# Patient Record
Sex: MALE | Race: WHITE | NOT HISPANIC OR LATINO | Employment: OTHER | URBAN - METROPOLITAN AREA
[De-identification: names, ages, dates, MRNs, and addresses within clinical notes are randomized per-mention and may not be internally consistent; named-entity substitution may affect disease eponyms.]

---

## 2023-03-11 PROBLEM — N40.1 BENIGN PROSTATIC HYPERPLASIA (BPH) WITH POST-VOID DRIBBLING: Status: ACTIVE | Noted: 2023-03-11

## 2023-03-11 PROBLEM — N18.30 STAGE 3 CHRONIC KIDNEY DISEASE (MULTI): Status: ACTIVE | Noted: 2023-03-11

## 2023-03-11 PROBLEM — M10.9 GOUT: Status: ACTIVE | Noted: 2023-03-11

## 2023-03-11 PROBLEM — I35.0 AORTIC STENOSIS, MILD: Status: ACTIVE | Noted: 2023-03-11

## 2023-03-11 PROBLEM — R01.1 HEART MURMUR, SYSTOLIC: Status: ACTIVE | Noted: 2023-03-11

## 2023-03-11 PROBLEM — F03.90 DEMENTIA (MULTI): Status: ACTIVE | Noted: 2023-03-11

## 2023-03-11 PROBLEM — I50.30 HEART FAILURE WITH PRESERVED EJECTION FRACTION (MULTI): Status: ACTIVE | Noted: 2023-03-11

## 2023-03-11 PROBLEM — N39.43 BENIGN PROSTATIC HYPERPLASIA (BPH) WITH POST-VOID DRIBBLING: Status: ACTIVE | Noted: 2023-03-11

## 2023-03-11 PROBLEM — I10 BENIGN HYPERTENSION: Status: ACTIVE | Noted: 2023-03-11

## 2023-03-11 PROBLEM — I48.19 ATRIAL FIBRILLATION, PERSISTENT (MULTI): Status: ACTIVE | Noted: 2023-03-11

## 2023-03-11 PROBLEM — E11.9 DIABETES (MULTI): Status: ACTIVE | Noted: 2023-03-11

## 2023-03-11 PROBLEM — I63.9 CVA (CEREBRAL VASCULAR ACCIDENT) (MULTI): Status: ACTIVE | Noted: 2023-03-11

## 2023-03-11 PROBLEM — R25.1 TREMOR: Status: ACTIVE | Noted: 2023-03-11

## 2023-03-11 PROBLEM — E78.5 HYPERLIPIDEMIA: Status: ACTIVE | Noted: 2023-03-11

## 2023-03-11 PROBLEM — I87.2 CHRONIC VENOUS INSUFFICIENCY: Status: ACTIVE | Noted: 2023-03-11

## 2023-04-21 ENCOUNTER — NURSING HOME VISIT (OUTPATIENT)
Dept: POST ACUTE CARE | Facility: EXTERNAL LOCATION | Age: 88
End: 2023-04-21
Payer: MEDICARE

## 2023-04-21 VITALS
SYSTOLIC BLOOD PRESSURE: 98 MMHG | DIASTOLIC BLOOD PRESSURE: 60 MMHG | BODY MASS INDEX: 23.6 KG/M2 | RESPIRATION RATE: 20 BRPM | WEIGHT: 174 LBS | OXYGEN SATURATION: 93 % | TEMPERATURE: 98 F | HEART RATE: 82 BPM

## 2023-04-21 DIAGNOSIS — N18.32 STAGE 3B CHRONIC KIDNEY DISEASE (MULTI): ICD-10-CM

## 2023-04-21 DIAGNOSIS — I48.19 ATRIAL FIBRILLATION, PERSISTENT (MULTI): ICD-10-CM

## 2023-04-21 DIAGNOSIS — I50.32 CHRONIC HEART FAILURE WITH PRESERVED EJECTION FRACTION (MULTI): ICD-10-CM

## 2023-04-21 DIAGNOSIS — F03.B0 MODERATE DEMENTIA WITHOUT BEHAVIORAL DISTURBANCE, PSYCHOTIC DISTURBANCE, MOOD DISTURBANCE, OR ANXIETY, UNSPECIFIED DEMENTIA TYPE (MULTI): ICD-10-CM

## 2023-04-21 DIAGNOSIS — E11.69 TYPE 2 DIABETES MELLITUS WITH OTHER SPECIFIED COMPLICATION, WITHOUT LONG-TERM CURRENT USE OF INSULIN (MULTI): ICD-10-CM

## 2023-04-21 DIAGNOSIS — I63.9 CEREBROVASCULAR ACCIDENT (CVA), UNSPECIFIED MECHANISM (MULTI): Primary | ICD-10-CM

## 2023-04-21 PROCEDURE — 99309 SBSQ NF CARE MODERATE MDM 30: CPT | Performed by: INTERNAL MEDICINE

## 2023-04-21 NOTE — PROGRESS NOTES
Subjective   Patient ID: Zaire Jara is a 89 y.o. male who is long term resident being seen and evaluated for multiple medical problems.    HPI   This 89-year-old male patient is resting comfortably in his wheelchair sitting up in his room in no distress.  The patient has been receiving knee injections from orthopedic surgery for his painful knees.  Per nursing this seems to have given him some relief.  The patient himself has no complaints for me at this time and nursing has no adverse events reported to me.    Current high risk medication:  Aldactone  Allopurinol  Eliquis  Iron  Flomax  Januvia    Laboratory examination from December 2022:  Potassium 4.4  Bicarbonate 19  Creatinine 1.7  Albumin 3.7  Liver injury test normal  A1c 6.8  Hemoglobin 11.9  Ferritin 106    Review of Systems   Constitutional:  Negative for chills, diaphoresis and fever.   Respiratory:  Negative for cough and shortness of breath.    Cardiovascular:  Negative for chest pain and leg swelling.   Gastrointestinal:  Negative for constipation, diarrhea, nausea and vomiting.   Musculoskeletal:  Negative for joint swelling and myalgias.       Objective   BP 98/60   Pulse 82   Temp 36.7 °C (98 °F)   Resp 20   Wt 78.9 kg (174 lb)   SpO2 93%   BMI 23.60 kg/m²     Physical Exam  Vitals reviewed.   Constitutional:       General: He is not in acute distress.     Appearance: He is not ill-appearing.      Comments: Frail elderly male resting comfortably in wheelchair no distress   Cardiovascular:      Rate and Rhythm: Normal rate and regular rhythm.      Pulses: Normal pulses.      Heart sounds:      No gallop.   Pulmonary:      Breath sounds: Normal breath sounds. No wheezing, rhonchi or rales.   Abdominal:      General: Abdomen is flat. Bowel sounds are normal.      Palpations: Abdomen is soft.      Tenderness: There is no guarding or rebound.   Musculoskeletal:      Right lower leg: No edema.      Left lower leg: No edema.      Comments:  There is noted absence of lower extremity edema at this time         Assessment/Plan   Problem List Items Addressed This Visit          Nervous    CVA (cerebral vascular accident) (CMS/[facility]) - Primary    Dementia (CMS/[facility])       Circulatory    Atrial fibrillation, persistent (CMS/[facility])    Heart failure with preserved ejection fraction (CMS/[facility])       Genitourinary    Stage 3 chronic kidney disease (CMS/[facility])       Endocrine/Metabolic    Diabetes (CMS/[facility])       A.  We will continue with restorative and supportive care as the patient tolerates    B.  Laboratory examinations will next be due in June 2023 including hemoglobin A1c, ferritin, uric acid, TSH, vitamin B12, and vitamin D    C.  Ongoing orthopedic follow-up for the patient's knee injections to assist with the pain from severe osteoarthritis of the knee.  Due to his debility and multiple comorbid conditions the patient is not a very good candidate for joint replacement therapy.    D.  The patient's prognosis is guarded.

## 2023-04-21 NOTE — LETTER
Patient: Zaire Jara  : 1932    Encounter Date: 2023    Subjective  Patient ID: Zaire Jara is a 89 y.o. male who is long term resident being seen and evaluated for multiple medical problems.    HPI   This 89-year-old male patient is resting comfortably in his wheelchair sitting up in his room in no distress.  The patient has been receiving knee injections from orthopedic surgery for his painful knees.  Per nursing this seems to have given him some relief.  The patient himself has no complaints for me at this time and nursing has no adverse events reported to me.    Current high risk medication:  Aldactone  Allopurinol  Eliquis  Iron  Flomax  Januvia    Laboratory examination from 2022:  Potassium 4.4  Bicarbonate 19  Creatinine 1.7  Albumin 3.7  Liver injury test normal  A1c 6.8  Hemoglobin 11.9  Ferritin 106    Review of Systems   Constitutional:  Negative for chills, diaphoresis and fever.   Respiratory:  Negative for cough and shortness of breath.    Cardiovascular:  Negative for chest pain and leg swelling.   Gastrointestinal:  Negative for constipation, diarrhea, nausea and vomiting.   Musculoskeletal:  Negative for joint swelling and myalgias.       Objective  BP 98/60   Pulse 82   Temp 36.7 °C (98 °F)   Resp 20   Wt 78.9 kg (174 lb)   SpO2 93%   BMI 23.60 kg/m²     Physical Exam  Vitals reviewed.   Constitutional:       General: He is not in acute distress.     Appearance: He is not ill-appearing.      Comments: Frail elderly male resting comfortably in wheelchair no distress   Cardiovascular:      Rate and Rhythm: Normal rate and regular rhythm.      Pulses: Normal pulses.      Heart sounds:      No gallop.   Pulmonary:      Breath sounds: Normal breath sounds. No wheezing, rhonchi or rales.   Abdominal:      General: Abdomen is flat. Bowel sounds are normal.      Palpations: Abdomen is soft.      Tenderness: There is no guarding or rebound.   Musculoskeletal:      Right  lower leg: No edema.      Left lower leg: No edema.      Comments: There is noted absence of lower extremity edema at this time         Assessment/Plan  Problem List Items Addressed This Visit          Nervous    CVA (cerebral vascular accident) (CMS/[facility]) - Primary    Dementia (CMS/[facility])       Circulatory    Atrial fibrillation, persistent (CMS/[facility])    Heart failure with preserved ejection fraction (CMS/[facility])       Genitourinary    Stage 3 chronic kidney disease (CMS/[facility])       Endocrine/Metabolic    Diabetes (CMS/[facility])       A.  We will continue with restorative and supportive care as the patient tolerates    B.  Laboratory examinations will next be due in June 2023 including hemoglobin A1c, ferritin, uric acid, TSH, vitamin B12, and vitamin D    C.  Ongoing orthopedic follow-up for the patient's knee injections to assist with the pain from severe osteoarthritis of the knee.  Due to his debility and multiple comorbid conditions the patient is not a very good candidate for joint replacement therapy.    D.  The patient's prognosis is guarded.      Electronically Signed By: True Garrido MD   4/24/23  2:28 PM

## 2023-04-24 ASSESSMENT — ENCOUNTER SYMPTOMS
SHORTNESS OF BREATH: 0
NAUSEA: 0
COUGH: 0
DIAPHORESIS: 0
DIARRHEA: 0
MYALGIAS: 0
JOINT SWELLING: 0
CONSTIPATION: 0
VOMITING: 0
CHILLS: 0
FEVER: 0

## 2023-04-25 ENCOUNTER — NURSING HOME VISIT (OUTPATIENT)
Dept: POST ACUTE CARE | Facility: EXTERNAL LOCATION | Age: 88
End: 2023-04-25
Payer: MEDICARE

## 2023-04-25 VITALS
RESPIRATION RATE: 20 BRPM | BODY MASS INDEX: 23.6 KG/M2 | DIASTOLIC BLOOD PRESSURE: 67 MMHG | SYSTOLIC BLOOD PRESSURE: 110 MMHG | TEMPERATURE: 98.5 F | WEIGHT: 174 LBS | OXYGEN SATURATION: 93 % | HEART RATE: 94 BPM

## 2023-04-25 DIAGNOSIS — E11.69 TYPE 2 DIABETES MELLITUS WITH OTHER SPECIFIED COMPLICATION, WITHOUT LONG-TERM CURRENT USE OF INSULIN (MULTI): ICD-10-CM

## 2023-04-25 DIAGNOSIS — I48.19 ATRIAL FIBRILLATION, PERSISTENT (MULTI): ICD-10-CM

## 2023-04-25 DIAGNOSIS — F03.B0 MODERATE DEMENTIA WITHOUT BEHAVIORAL DISTURBANCE, PSYCHOTIC DISTURBANCE, MOOD DISTURBANCE, OR ANXIETY, UNSPECIFIED DEMENTIA TYPE (MULTI): ICD-10-CM

## 2023-04-25 DIAGNOSIS — N18.32 STAGE 3B CHRONIC KIDNEY DISEASE (MULTI): ICD-10-CM

## 2023-04-25 DIAGNOSIS — I63.9 CEREBROVASCULAR ACCIDENT (CVA), UNSPECIFIED MECHANISM (MULTI): Primary | ICD-10-CM

## 2023-04-25 PROCEDURE — 99309 SBSQ NF CARE MODERATE MDM 30: CPT | Performed by: NURSE PRACTITIONER

## 2023-04-25 ASSESSMENT — ENCOUNTER SYMPTOMS
CHILLS: 0
COUGH: 0
PALPITATIONS: 0
FEVER: 0
FATIGUE: 0
DIFFICULTY URINATING: 0
VOMITING: 0
ABDOMINAL PAIN: 0
CONSTIPATION: 0
NAUSEA: 0
SHORTNESS OF BREATH: 0
DIARRHEA: 0

## 2023-04-25 NOTE — LETTER
Patient: Zaire Jara  : 1932    Encounter Date: 2023    Subjective  Zaire Jara is a 89 y.o. male Here for routine monthly visit.    HPI There are no new problems and multiple health problems have been reviewed.  The course has been unchanged.  The patient  is mildly confused.    He recently had knee injected per orthopedics, per staff he is moving better.  There are no family members present during time of visit.    he  is sitting up in chair denies any complaints when asked.  Eating and drinking well.   No concerns per staff.       Review of Systems   Constitutional:  Negative for chills, fatigue and fever.   Respiratory:  Negative for cough and shortness of breath.    Cardiovascular:  Negative for chest pain and palpitations.   Gastrointestinal:  Negative for abdominal pain, constipation, diarrhea, nausea and vomiting.   Genitourinary:  Negative for difficulty urinating.       Objective  /67   Pulse 94   Temp 36.9 °C (98.5 °F)   Resp 20   Wt 78.9 kg (174 lb)   SpO2 93%   BMI 23.60 kg/m²     Physical Exam  Constitutional:       General: He is not in acute distress.  HENT:      Head: Normocephalic and atraumatic.   Eyes:      Conjunctiva/sclera: Conjunctivae normal.   Cardiovascular:      Rate and Rhythm: Normal rate and regular rhythm.   Pulmonary:      Effort: Pulmonary effort is normal. No respiratory distress.      Breath sounds: Normal breath sounds.   Abdominal:      General: Bowel sounds are normal. There is no distension.      Palpations: Abdomen is soft.      Tenderness: There is no abdominal tenderness.   Musculoskeletal:      Right lower leg: No edema.      Left lower leg: No edema.      Comments: diffusely decreased muscle mass  Mild left sided weakness.   Skin:     General: Skin is warm and dry.   Neurological:      General: No focal deficit present.      Mental Status: He is alert.   Psychiatric:         Mood and Affect: Mood normal.         Behavior: Behavior normal.          Assessment/Plan  Problem List Items Addressed This Visit          Nervous    CVA (cerebral vascular accident) (CMS/[facility]) - Primary     On statin.  continue with current medical management           Dementia (CMS/[facility])     Mild, forgetful.              Circulatory    Atrial fibrillation, persistent (CMS/[facility])     On eliquis,  rate controlled.  No sx of bleeding.  continue with current medical management                Genitourinary    Stage 3 chronic kidney disease (CMS/[facility])     monitor with routine labs.              Endocrine/Metabolic    Diabetes (CMS/[facility])     Blood sugars reviewed, acceptable.  Continue with current regimen and adjust meds based on clinical course.   On januvia          labs/meds/orders reviewed  staff to monitor and notify for any changes.  continue with supportive and restorative care  next labs 6/23 and prn      Electronically Signed By: NUSRAT Keller-CNP   4/25/23  4:27 PM

## 2023-04-25 NOTE — ASSESSMENT & PLAN NOTE
Blood sugars reviewed, acceptable.  Continue with current regimen and adjust meds based on clinical course.   On januvia

## 2023-04-25 NOTE — PROGRESS NOTES
Subjective   Zaire Jara is a 89 y.o. male Here for routine monthly visit.    HPI There are no new problems and multiple health problems have been reviewed.  The course has been unchanged.  The patient  is mildly confused.    He recently had knee injected per orthopedics, per staff he is moving better.  There are no family members present during time of visit.    he  is sitting up in chair denies any complaints when asked.  Eating and drinking well.   No concerns per staff.       Review of Systems   Constitutional:  Negative for chills, fatigue and fever.   Respiratory:  Negative for cough and shortness of breath.    Cardiovascular:  Negative for chest pain and palpitations.   Gastrointestinal:  Negative for abdominal pain, constipation, diarrhea, nausea and vomiting.   Genitourinary:  Negative for difficulty urinating.       Objective   /67   Pulse 94   Temp 36.9 °C (98.5 °F)   Resp 20   Wt 78.9 kg (174 lb)   SpO2 93%   BMI 23.60 kg/m²     Physical Exam  Constitutional:       General: He is not in acute distress.  HENT:      Head: Normocephalic and atraumatic.   Eyes:      Conjunctiva/sclera: Conjunctivae normal.   Cardiovascular:      Rate and Rhythm: Normal rate and regular rhythm.   Pulmonary:      Effort: Pulmonary effort is normal. No respiratory distress.      Breath sounds: Normal breath sounds.   Abdominal:      General: Bowel sounds are normal. There is no distension.      Palpations: Abdomen is soft.      Tenderness: There is no abdominal tenderness.   Musculoskeletal:      Right lower leg: No edema.      Left lower leg: No edema.      Comments: diffusely decreased muscle mass  Mild left sided weakness.   Skin:     General: Skin is warm and dry.   Neurological:      General: No focal deficit present.      Mental Status: He is alert.   Psychiatric:         Mood and Affect: Mood normal.         Behavior: Behavior normal.         Assessment/Plan   Problem List Items Addressed This Visit           Nervous    CVA (cerebral vascular accident) (CMS/[facility]) - Primary     On statin.  continue with current medical management           Dementia (CMS/[facility])     Mild, forgetful.              Circulatory    Atrial fibrillation, persistent (CMS/[facility])     On eliquis,  rate controlled.  No sx of bleeding.  continue with current medical management                Genitourinary    Stage 3 chronic kidney disease (CMS/[facility])     monitor with routine labs.              Endocrine/Metabolic    Diabetes (CMS/[facility])     Blood sugars reviewed, acceptable.  Continue with current regimen and adjust meds based on clinical course.   On januvia          labs/meds/orders reviewed  staff to monitor and notify for any changes.  continue with supportive and restorative care  next labs 6/23 and prn

## 2023-05-26 ENCOUNTER — NURSING HOME VISIT (OUTPATIENT)
Dept: POST ACUTE CARE | Facility: EXTERNAL LOCATION | Age: 88
End: 2023-05-26
Payer: MEDICARE

## 2023-05-26 DIAGNOSIS — I48.19 ATRIAL FIBRILLATION, PERSISTENT (MULTI): ICD-10-CM

## 2023-05-26 DIAGNOSIS — I10 BENIGN HYPERTENSION: ICD-10-CM

## 2023-05-26 DIAGNOSIS — F03.B0 MODERATE DEMENTIA WITHOUT BEHAVIORAL DISTURBANCE, PSYCHOTIC DISTURBANCE, MOOD DISTURBANCE, OR ANXIETY, UNSPECIFIED DEMENTIA TYPE (MULTI): ICD-10-CM

## 2023-05-26 DIAGNOSIS — I63.9 CEREBROVASCULAR ACCIDENT (CVA), UNSPECIFIED MECHANISM (MULTI): ICD-10-CM

## 2023-05-26 DIAGNOSIS — E11.69 TYPE 2 DIABETES MELLITUS WITH OTHER SPECIFIED COMPLICATION, WITHOUT LONG-TERM CURRENT USE OF INSULIN (MULTI): ICD-10-CM

## 2023-05-26 DIAGNOSIS — I50.32 CHRONIC HEART FAILURE WITH PRESERVED EJECTION FRACTION (MULTI): Primary | ICD-10-CM

## 2023-05-26 PROCEDURE — 99308 SBSQ NF CARE LOW MDM 20: CPT | Performed by: INTERNAL MEDICINE

## 2023-05-26 NOTE — LETTER
Patient: Zaire Jara  : 1932    Encounter Date: 2023    Subjective  Patient ID: Zaire Jara is a 89 y.o. male who is long term resident being seen and evaluated for multiple medical problems.    HPI   This 89-year-old male patient is resting comfortably in his bed in no distress.  He has no complaints of pain or shortness of breath me at this time.  Nursing has no new adverse events reported to me.    Current high risk medication:  Aldactone  Allopurinol  Eliquis  Iron  Tamsulosin  Januvia    Laboratory examination from 2022:  Potassium 4.4  Bicarbonate 19  Creatinine 1.7  Albumin 3.5  Liver injury test normal  Hemoglobin A1c 6.8  Hemoglobin 11.9  Ferritin 106    Review of Systems   Constitutional:  Negative for chills, diaphoresis and fever.   Respiratory:  Negative for cough and shortness of breath.    Cardiovascular:  Negative for chest pain and leg swelling.   Gastrointestinal:  Negative for constipation, diarrhea, nausea and vomiting.   Musculoskeletal:  Negative for joint swelling and myalgias.       Objective  /62   Pulse 82   Temp 36.8 °C (98.3 °F)   Resp 18   Wt 79.4 kg (175 lb)   SpO2 97%   BMI 23.73 kg/m²     Physical Exam  Constitutional:       General: He is not in acute distress.  HENT:      Head: Normocephalic and atraumatic.   Eyes:      Conjunctiva/sclera: Conjunctivae normal.   Cardiovascular:      Rate and Rhythm: Normal rate and regular rhythm.   Pulmonary:      Effort: Pulmonary effort is normal. No respiratory distress.      Breath sounds: Normal breath sounds.   Abdominal:      General: Bowel sounds are normal. There is no distension.      Palpations: Abdomen is soft.      Tenderness: There is no abdominal tenderness.   Musculoskeletal:      Right lower leg: No edema.      Left lower leg: No edema.      Comments: diffusely decreased muscle mass  Mild left sided weakness.   Skin:     General: Skin is warm and dry.   Neurological:      General: No focal  deficit present.      Mental Status: He is alert.   Psychiatric:         Mood and Affect: Mood normal.         Behavior: Behavior normal.         Assessment/Plan  Problem List Items Addressed This Visit          Nervous    CVA (cerebral vascular accident) (CMS/[facility])    Dementia (CMS/[facility])       Circulatory    Atrial fibrillation, persistent (CMS/[facility])    Benign hypertension    Heart failure with preserved ejection fraction (CMS/[facility]) - Primary       Endocrine/Metabolic    Diabetes (CMS/[facility])       A.  We will continue with restorative and supportive care as patient tolerates    B.  Laboratory examinations will next be due in June 2022 or as needed    C.  The patient's prognosis is guarded.      Electronically Signed By: True Garrido MD   6/5/23  4:39 PM

## 2023-05-30 VITALS
RESPIRATION RATE: 18 BRPM | HEART RATE: 82 BPM | SYSTOLIC BLOOD PRESSURE: 106 MMHG | BODY MASS INDEX: 23.73 KG/M2 | WEIGHT: 175 LBS | TEMPERATURE: 98.3 F | DIASTOLIC BLOOD PRESSURE: 62 MMHG | OXYGEN SATURATION: 97 %

## 2023-05-30 NOTE — PROGRESS NOTES
Subjective   Patient ID: Zaire Jara is a 89 y.o. male who is long term resident being seen and evaluated for multiple medical problems.    HPI   This 89-year-old male patient is resting comfortably in his bed in no distress.  He has no complaints of pain or shortness of breath me at this time.  Nursing has no new adverse events reported to me.    Current high risk medication:  Aldactone  Allopurinol  Eliquis  Iron  Tamsulosin  Januvia    Laboratory examination from December 2022:  Potassium 4.4  Bicarbonate 19  Creatinine 1.7  Albumin 3.5  Liver injury test normal  Hemoglobin A1c 6.8  Hemoglobin 11.9  Ferritin 106    Review of Systems   Constitutional:  Negative for chills, diaphoresis and fever.   Respiratory:  Negative for cough and shortness of breath.    Cardiovascular:  Negative for chest pain and leg swelling.   Gastrointestinal:  Negative for constipation, diarrhea, nausea and vomiting.   Musculoskeletal:  Negative for joint swelling and myalgias.       Objective   /62   Pulse 82   Temp 36.8 °C (98.3 °F)   Resp 18   Wt 79.4 kg (175 lb)   SpO2 97%   BMI 23.73 kg/m²     Physical Exam  Constitutional:       General: He is not in acute distress.  HENT:      Head: Normocephalic and atraumatic.   Eyes:      Conjunctiva/sclera: Conjunctivae normal.   Cardiovascular:      Rate and Rhythm: Normal rate and regular rhythm.   Pulmonary:      Effort: Pulmonary effort is normal. No respiratory distress.      Breath sounds: Normal breath sounds.   Abdominal:      General: Bowel sounds are normal. There is no distension.      Palpations: Abdomen is soft.      Tenderness: There is no abdominal tenderness.   Musculoskeletal:      Right lower leg: No edema.      Left lower leg: No edema.      Comments: diffusely decreased muscle mass  Mild left sided weakness.   Skin:     General: Skin is warm and dry.   Neurological:      General: No focal deficit present.      Mental Status: He is alert.   Psychiatric:          Mood and Affect: Mood normal.         Behavior: Behavior normal.         Assessment/Plan   Problem List Items Addressed This Visit          Nervous    CVA (cerebral vascular accident) (CMS/[facility])    Dementia (CMS/[facility])       Circulatory    Atrial fibrillation, persistent (CMS/[facility])    Benign hypertension    Heart failure with preserved ejection fraction (CMS/[facility]) - Primary       Endocrine/Metabolic    Diabetes (CMS/[facility])       A.  We will continue with restorative and supportive care as patient tolerates    B.  Laboratory examinations will next be due in June 2022 or as needed    C.  The patient's prognosis is guarded.

## 2023-06-05 ASSESSMENT — ENCOUNTER SYMPTOMS
NAUSEA: 0
FEVER: 0
JOINT SWELLING: 0
VOMITING: 0
SHORTNESS OF BREATH: 0
DIARRHEA: 0
COUGH: 0
CONSTIPATION: 0
CHILLS: 0
MYALGIAS: 0
DIAPHORESIS: 0

## 2023-06-16 ENCOUNTER — NURSING HOME VISIT (OUTPATIENT)
Dept: POST ACUTE CARE | Facility: EXTERNAL LOCATION | Age: 88
End: 2023-06-16
Payer: MEDICARE

## 2023-06-16 DIAGNOSIS — I48.19 ATRIAL FIBRILLATION, PERSISTENT (MULTI): ICD-10-CM

## 2023-06-16 DIAGNOSIS — I50.32 CHRONIC HEART FAILURE WITH PRESERVED EJECTION FRACTION (MULTI): Primary | ICD-10-CM

## 2023-06-16 DIAGNOSIS — I10 BENIGN HYPERTENSION: ICD-10-CM

## 2023-06-16 DIAGNOSIS — F03.B0 MODERATE DEMENTIA WITHOUT BEHAVIORAL DISTURBANCE, PSYCHOTIC DISTURBANCE, MOOD DISTURBANCE, OR ANXIETY, UNSPECIFIED DEMENTIA TYPE (MULTI): ICD-10-CM

## 2023-06-16 DIAGNOSIS — E11.69 TYPE 2 DIABETES MELLITUS WITH OTHER SPECIFIED COMPLICATION, WITHOUT LONG-TERM CURRENT USE OF INSULIN (MULTI): ICD-10-CM

## 2023-06-16 DIAGNOSIS — I63.9 CEREBROVASCULAR ACCIDENT (CVA), UNSPECIFIED MECHANISM (MULTI): ICD-10-CM

## 2023-06-16 DIAGNOSIS — N18.32 STAGE 3B CHRONIC KIDNEY DISEASE (MULTI): ICD-10-CM

## 2023-06-16 PROCEDURE — 99308 SBSQ NF CARE LOW MDM 20: CPT | Performed by: INTERNAL MEDICINE

## 2023-06-16 NOTE — LETTER
Patient: Zaire Jara  : 1932    Encounter Date: 2023    Subjective  Patient ID: Zaire Jara is a 89 y.o. male who is long term resident being seen and evaluated for multiple medical problems.    HPI   This 89-year-old male patient is sitting up in his wheelchair in his room in no distress.  He reports that his strength is improved and ability to stand is improved after knee injection therapy.  He has no complaints of pain or shortness of breath me at this time.    Current high risk medication:  Aldactone  Allopurinol  Eliquis  Iron  Tamsulosin  Januvia    Laboratory examination from 2022:  Potassium 4.4  Bicarbonate 19  Creatinine 1.7  Albumin 3.5  Liver injury test normal  A1c 6.8  Ferritin 106  Hemoglobin 11.9     Review of Systems   Constitutional:  Negative for chills, diaphoresis and fever.   Respiratory:  Negative for cough and shortness of breath.    Cardiovascular:  Negative for chest pain and leg swelling.   Gastrointestinal:  Negative for constipation, diarrhea, nausea and vomiting.   Musculoskeletal:  Negative for joint swelling and myalgias.       Objective  /63   Pulse 72   Temp 36.8 °C (98.2 °F)   Resp 16   Wt 79.4 kg (175 lb)   SpO2 98%   BMI 23.73 kg/m²     Physical Exam  Constitutional:       General: He is not in acute distress.  HENT:      Head: Normocephalic and atraumatic.   Eyes:      Conjunctiva/sclera: Conjunctivae normal.   Cardiovascular:      Rate and Rhythm: Normal rate and regular rhythm.   Pulmonary:      Effort: Pulmonary effort is normal. No respiratory distress.      Breath sounds: Normal breath sounds.   Abdominal:      General: Bowel sounds are normal. There is no distension.      Palpations: Abdomen is soft.      Tenderness: There is no abdominal tenderness.   Musculoskeletal:      Right lower leg: No edema.      Left lower leg: No edema.      Comments: diffusely decreased muscle mass  Mild left sided weakness.   Skin:     General: Skin is  warm and dry.   Neurological:      General: No focal deficit present.      Mental Status: He is alert.   Psychiatric:         Mood and Affect: Mood normal.         Behavior: Behavior normal.         Assessment/Plan  Problem List Items Addressed This Visit       Atrial fibrillation, persistent (CMS/[facility])    Benign hypertension    CVA (cerebral vascular accident) (CMS/[facility])    Dementia (CMS/[facility])    Diabetes (CMS/[facility])    Heart failure with preserved ejection fraction (CMS/[facility]) - Primary    Stage 3 chronic kidney disease (CMS/[facility])       A.  We will continue with restorative and supportive care as the patient tolerates    B.  Laboratory examinations of been ordered to monitor his medications and medical conditions    C.  The patient's prognosis is guarded.      Electronically Signed By: True Garrido MD   6/28/23  3:23 PM

## 2023-06-19 VITALS
DIASTOLIC BLOOD PRESSURE: 63 MMHG | RESPIRATION RATE: 16 BRPM | WEIGHT: 175 LBS | OXYGEN SATURATION: 98 % | TEMPERATURE: 98.2 F | BODY MASS INDEX: 23.73 KG/M2 | HEART RATE: 72 BPM | SYSTOLIC BLOOD PRESSURE: 106 MMHG

## 2023-06-19 NOTE — PROGRESS NOTES
Subjective   Patient ID: Zaire Jara is a 89 y.o. male who is long term resident being seen and evaluated for multiple medical problems.    HPI   This 89-year-old male patient is sitting up in his wheelchair in his room in no distress.  He reports that his strength is improved and ability to stand is improved after knee injection therapy.  He has no complaints of pain or shortness of breath me at this time.    Current high risk medication:  Aldactone  Allopurinol  Eliquis  Iron  Tamsulosin  Januvia    Laboratory examination from December 2022:  Potassium 4.4  Bicarbonate 19  Creatinine 1.7  Albumin 3.5  Liver injury test normal  A1c 6.8  Ferritin 106  Hemoglobin 11.9     Review of Systems   Constitutional:  Negative for chills, diaphoresis and fever.   Respiratory:  Negative for cough and shortness of breath.    Cardiovascular:  Negative for chest pain and leg swelling.   Gastrointestinal:  Negative for constipation, diarrhea, nausea and vomiting.   Musculoskeletal:  Negative for joint swelling and myalgias.       Objective   /63   Pulse 72   Temp 36.8 °C (98.2 °F)   Resp 16   Wt 79.4 kg (175 lb)   SpO2 98%   BMI 23.73 kg/m²     Physical Exam  Constitutional:       General: He is not in acute distress.  HENT:      Head: Normocephalic and atraumatic.   Eyes:      Conjunctiva/sclera: Conjunctivae normal.   Cardiovascular:      Rate and Rhythm: Normal rate and regular rhythm.   Pulmonary:      Effort: Pulmonary effort is normal. No respiratory distress.      Breath sounds: Normal breath sounds.   Abdominal:      General: Bowel sounds are normal. There is no distension.      Palpations: Abdomen is soft.      Tenderness: There is no abdominal tenderness.   Musculoskeletal:      Right lower leg: No edema.      Left lower leg: No edema.      Comments: diffusely decreased muscle mass  Mild left sided weakness.   Skin:     General: Skin is warm and dry.   Neurological:      General: No focal deficit present.       Mental Status: He is alert.   Psychiatric:         Mood and Affect: Mood normal.         Behavior: Behavior normal.         Assessment/Plan   Problem List Items Addressed This Visit       Atrial fibrillation, persistent (CMS/[facility])    Benign hypertension    CVA (cerebral vascular accident) (CMS/[facility])    Dementia (CMS/[facility])    Diabetes (CMS/[facility])    Heart failure with preserved ejection fraction (CMS/[facility]) - Primary    Stage 3 chronic kidney disease (CMS/[facility])       A.  We will continue with restorative and supportive care as the patient tolerates    B.  Laboratory examinations of been ordered to monitor his medications and medical conditions    C.  The patient's prognosis is guarded.

## 2023-06-20 ENCOUNTER — NURSING HOME VISIT (OUTPATIENT)
Dept: POST ACUTE CARE | Facility: EXTERNAL LOCATION | Age: 88
End: 2023-06-20
Payer: MEDICARE

## 2023-06-20 VITALS
RESPIRATION RATE: 16 BRPM | DIASTOLIC BLOOD PRESSURE: 55 MMHG | SYSTOLIC BLOOD PRESSURE: 115 MMHG | HEART RATE: 93 BPM | WEIGHT: 174 LBS | TEMPERATURE: 98.3 F | OXYGEN SATURATION: 98 % | BODY MASS INDEX: 23.6 KG/M2

## 2023-06-20 DIAGNOSIS — F03.B0 MODERATE DEMENTIA WITHOUT BEHAVIORAL DISTURBANCE, PSYCHOTIC DISTURBANCE, MOOD DISTURBANCE, OR ANXIETY, UNSPECIFIED DEMENTIA TYPE (MULTI): ICD-10-CM

## 2023-06-20 DIAGNOSIS — I48.19 ATRIAL FIBRILLATION, PERSISTENT (MULTI): ICD-10-CM

## 2023-06-20 DIAGNOSIS — N18.32 STAGE 3B CHRONIC KIDNEY DISEASE (MULTI): ICD-10-CM

## 2023-06-20 DIAGNOSIS — E11.69 TYPE 2 DIABETES MELLITUS WITH OTHER SPECIFIED COMPLICATION, WITHOUT LONG-TERM CURRENT USE OF INSULIN (MULTI): ICD-10-CM

## 2023-06-20 DIAGNOSIS — I50.32 CHRONIC HEART FAILURE WITH PRESERVED EJECTION FRACTION (MULTI): ICD-10-CM

## 2023-06-20 DIAGNOSIS — I63.9 CEREBROVASCULAR ACCIDENT (CVA), UNSPECIFIED MECHANISM (MULTI): Primary | ICD-10-CM

## 2023-06-20 PROCEDURE — 99309 SBSQ NF CARE MODERATE MDM 30: CPT | Performed by: NURSE PRACTITIONER

## 2023-06-20 ASSESSMENT — ENCOUNTER SYMPTOMS
FATIGUE: 0
CHILLS: 0
FEVER: 0
VOMITING: 0
ABDOMINAL PAIN: 0
COUGH: 0
PALPITATIONS: 0
DIFFICULTY URINATING: 0
DIARRHEA: 0
NAUSEA: 0
SHORTNESS OF BREATH: 0
CONSTIPATION: 0

## 2023-06-20 NOTE — ASSESSMENT & PLAN NOTE
Blood sugars reviewed, acceptable.  Continue with current regimen and adjust meds based on clinical course.   On januvia, prtoein/creatinine ratio normal. . A1C is pending

## 2023-06-20 NOTE — LETTER
Patient: Zaire Jara  : 1932    Encounter Date: 2023    Subjective  Zaire Jara is a 89 y.o. male Here for routine monthly visit.    HPI There are no new problems and multiple health problems have been reviewed.  The course has been unchanged.  The patient  is mildly confused.  There are no family members present during time of visit.    he  is sitting up in chair denies any complaints when asked.  Eating and drinking well.   No concerns per staff.       Review of Systems   Constitutional:  Negative for chills, fatigue and fever.   Respiratory:  Negative for cough and shortness of breath.    Cardiovascular:  Negative for chest pain and palpitations.   Gastrointestinal:  Negative for abdominal pain, constipation, diarrhea, nausea and vomiting.   Genitourinary:  Negative for difficulty urinating.       Objective  /55   Pulse 93   Temp 36.8 °C (98.3 °F)   Resp 16   Wt 78.9 kg (174 lb)   SpO2 98%   BMI 23.60 kg/m²     Physical Exam  Constitutional:       General: He is not in acute distress.  HENT:      Head: Normocephalic and atraumatic.   Eyes:      Conjunctiva/sclera: Conjunctivae normal.   Cardiovascular:      Rate and Rhythm: Normal rate and regular rhythm.   Pulmonary:      Effort: Pulmonary effort is normal. No respiratory distress.      Breath sounds: Normal breath sounds.   Abdominal:      General: Bowel sounds are normal. There is no distension.      Palpations: Abdomen is soft.      Tenderness: There is no abdominal tenderness.   Musculoskeletal:      Right lower leg: No edema.      Left lower leg: No edema.      Comments: diffusely decreased muscle mass  Mild left sided weakness.   Skin:     General: Skin is warm and dry.   Neurological:      General: No focal deficit present.      Mental Status: He is alert.   Psychiatric:         Mood and Affect: Mood normal.         Behavior: Behavior normal.         Assessment/Plan  Problem List Items Addressed This Visit          Nervous     CVA (cerebral vascular accident) (CMS/[facility]) - Primary     On statin.  continue with current medical management           Dementia (CMS/HCC)     Mild, forgetful.              Circulatory    Atrial fibrillation, persistent (CMS/HCC)     On eliquis,  rate controlled.  No sx of bleeding.  continue with current medical management             Heart failure with preserved ejection fraction (CMS/[facility])     No symptoms of central fluid volume overload.              Endocrine/Metabolic    Diabetes (CMS/[facility])     Blood sugars reviewed, acceptable.  Continue with current regimen and adjust meds based on clinical course.   On januvia, prtoein/creatinine ratio normal. . A1C is pending          labs/meds/orders reviewed  staff to monitor and notify for any changes.  continue with supportive and restorative care  next labs 12/23 and prn      Electronically Signed By: NUSRAT Keller-CHENCHO   6/20/23 12:52 PM

## 2023-06-20 NOTE — PROGRESS NOTES
Subjective   Zaire Jara is a 89 y.o. male Here for routine monthly visit.    HPI There are no new problems and multiple health problems have been reviewed.  The course has been unchanged.  The patient  is mildly confused.  There are no family members present during time of visit.    he  is sitting up in chair denies any complaints when asked.  Eating and drinking well.   No concerns per staff.       Review of Systems   Constitutional:  Negative for chills, fatigue and fever.   Respiratory:  Negative for cough and shortness of breath.    Cardiovascular:  Negative for chest pain and palpitations.   Gastrointestinal:  Negative for abdominal pain, constipation, diarrhea, nausea and vomiting.   Genitourinary:  Negative for difficulty urinating.       Objective   /55   Pulse 93   Temp 36.8 °C (98.3 °F)   Resp 16   Wt 78.9 kg (174 lb)   SpO2 98%   BMI 23.60 kg/m²     Physical Exam  Constitutional:       General: He is not in acute distress.  HENT:      Head: Normocephalic and atraumatic.   Eyes:      Conjunctiva/sclera: Conjunctivae normal.   Cardiovascular:      Rate and Rhythm: Normal rate and regular rhythm.   Pulmonary:      Effort: Pulmonary effort is normal. No respiratory distress.      Breath sounds: Normal breath sounds.   Abdominal:      General: Bowel sounds are normal. There is no distension.      Palpations: Abdomen is soft.      Tenderness: There is no abdominal tenderness.   Musculoskeletal:      Right lower leg: No edema.      Left lower leg: No edema.      Comments: diffusely decreased muscle mass  Mild left sided weakness.   Skin:     General: Skin is warm and dry.   Neurological:      General: No focal deficit present.      Mental Status: He is alert.   Psychiatric:         Mood and Affect: Mood normal.         Behavior: Behavior normal.         Assessment/Plan   Problem List Items Addressed This Visit          Nervous    CVA (cerebral vascular accident) (CMS/HCC) - Primary     On  statin.  continue with current medical management           Dementia (CMS/[facility])     Mild, forgetful.              Circulatory    Atrial fibrillation, persistent (CMS/[facility])     On eliquis,  rate controlled.  No sx of bleeding.  continue with current medical management             Heart failure with preserved ejection fraction (CMS/[facility])     No symptoms of central fluid volume overload.              Endocrine/Metabolic    Diabetes (CMS/[facility])     Blood sugars reviewed, acceptable.  Continue with current regimen and adjust meds based on clinical course.   On januvia, prtoein/creatinine ratio normal. . A1C is pending          labs/meds/orders reviewed  staff to monitor and notify for any changes.  continue with supportive and restorative care  next labs 12/23 and prn

## 2023-06-28 ASSESSMENT — ENCOUNTER SYMPTOMS
MYALGIAS: 0
JOINT SWELLING: 0
CHILLS: 0
CONSTIPATION: 0
NAUSEA: 0
SHORTNESS OF BREATH: 0
FEVER: 0
VOMITING: 0
DIARRHEA: 0
COUGH: 0
DIAPHORESIS: 0

## 2023-07-21 ENCOUNTER — NURSING HOME VISIT (OUTPATIENT)
Dept: POST ACUTE CARE | Facility: EXTERNAL LOCATION | Age: 88
End: 2023-07-21
Payer: MEDICARE

## 2023-07-21 DIAGNOSIS — I50.32 CHRONIC HEART FAILURE WITH PRESERVED EJECTION FRACTION (MULTI): Primary | ICD-10-CM

## 2023-07-21 DIAGNOSIS — E11.69 TYPE 2 DIABETES MELLITUS WITH OTHER SPECIFIED COMPLICATION, WITHOUT LONG-TERM CURRENT USE OF INSULIN (MULTI): ICD-10-CM

## 2023-07-21 DIAGNOSIS — I63.9 CEREBROVASCULAR ACCIDENT (CVA), UNSPECIFIED MECHANISM (MULTI): ICD-10-CM

## 2023-07-21 DIAGNOSIS — F03.B0 MODERATE DEMENTIA WITHOUT BEHAVIORAL DISTURBANCE, PSYCHOTIC DISTURBANCE, MOOD DISTURBANCE, OR ANXIETY, UNSPECIFIED DEMENTIA TYPE (MULTI): ICD-10-CM

## 2023-07-21 DIAGNOSIS — I10 BENIGN HYPERTENSION: ICD-10-CM

## 2023-07-21 DIAGNOSIS — I48.19 ATRIAL FIBRILLATION, PERSISTENT (MULTI): ICD-10-CM

## 2023-07-21 PROCEDURE — 99308 SBSQ NF CARE LOW MDM 20: CPT | Performed by: INTERNAL MEDICINE

## 2023-07-21 NOTE — LETTER
Patient: Zaire Jara  : 1932    Encounter Date: 2023    Subjective  Patient ID: Zaire Jara is a 89 y.o. male who is long term resident being seen and evaluated for multiple medical problems.    HPI   This 89-year-old male patient is sitting comfortably in his wheelchair in no distress.  The patient has recently been treated with doxycycline for toe infection.  The patient also is status post left forehead skin biopsy he himself has no complaints of pain or shortness of breath and nursing has no new other adverse events reported to me.    Current high risk medication:  Aldactone  Allopurinol  Eliquis  Iron  Flomax  Januvia    Laboratory examinations from 2023:  Potassium 4.5  Bicarbonate 23  Creatinine 1.5  Albumin 3.4  Liver injury test normal  Hemoglobin 11.0  B12 519  TSH 1.6  Vitamin D 33  A1c 6.5    Review of Systems   Constitutional:  Negative for chills, diaphoresis and fever.   Respiratory:  Negative for cough and shortness of breath.    Cardiovascular:  Negative for chest pain and leg swelling.   Gastrointestinal:  Negative for constipation, diarrhea, nausea and vomiting.   Musculoskeletal:  Negative for joint swelling and myalgias.       Objective  /72   Pulse 76   Temp 36.6 °C (97.9 °F)   Resp 18   Wt 81.2 kg (179 lb)   SpO2 93%   BMI 24.28 kg/m²     Physical Exam  Constitutional:       General: He is not in acute distress.  HENT:      Head: Normocephalic and atraumatic.   Eyes:      Conjunctiva/sclera: Conjunctivae normal.   Cardiovascular:      Rate and Rhythm: Normal rate and regular rhythm.   Pulmonary:      Effort: Pulmonary effort is normal. No respiratory distress.      Breath sounds: Normal breath sounds.   Abdominal:      General: Bowel sounds are normal. There is no distension.      Palpations: Abdomen is soft.      Tenderness: There is no abdominal tenderness.   Musculoskeletal:      Right lower leg: No edema.      Left lower leg: No edema.      Comments:  diffusely decreased muscle mass  Mild left sided weakness.   Skin:     General: Skin is warm and dry.   Neurological:      General: No focal deficit present.      Mental Status: He is alert.   Psychiatric:         Mood and Affect: Mood normal.         Behavior: Behavior normal.         Assessment/Plan  Problem List Items Addressed This Visit       Atrial fibrillation, persistent (CMS/HCC)    Benign hypertension    CVA (cerebral vascular accident) (CMS/HCC)    Dementia (CMS/HCC)    Diabetes (CMS/HCC)    Heart failure with preserved ejection fraction (CMS/HCC) - Primary       A.  We will continue with restorative and supportive care as the patient tolerates    B.  Laboratory examinations will next be due in December 2023 or as needed    C.  Ongoing monitoring of the patient's ingrown toenail for further infection    D.  The patient's prognosis is guarded.      Electronically Signed By: True Garrido MD   7/31/23  4:44 PM

## 2023-07-26 VITALS
WEIGHT: 179 LBS | OXYGEN SATURATION: 93 % | TEMPERATURE: 97.9 F | SYSTOLIC BLOOD PRESSURE: 108 MMHG | RESPIRATION RATE: 18 BRPM | BODY MASS INDEX: 24.28 KG/M2 | DIASTOLIC BLOOD PRESSURE: 72 MMHG | HEART RATE: 76 BPM

## 2023-07-26 NOTE — PROGRESS NOTES
Subjective   Patient ID: Zaire Jara is a 89 y.o. male who is long term resident being seen and evaluated for multiple medical problems.    HPI   This 89-year-old male patient is sitting comfortably in his wheelchair in no distress.  The patient has recently been treated with doxycycline for toe infection.  The patient also is status post left forehead skin biopsy he himself has no complaints of pain or shortness of breath and nursing has no new other adverse events reported to me.    Current high risk medication:  Aldactone  Allopurinol  Eliquis  Iron  Flomax  Januvia    Laboratory examinations from June 2023:  Potassium 4.5  Bicarbonate 23  Creatinine 1.5  Albumin 3.4  Liver injury test normal  Hemoglobin 11.0  B12 519  TSH 1.6  Vitamin D 33  A1c 6.5    Review of Systems   Constitutional:  Negative for chills, diaphoresis and fever.   Respiratory:  Negative for cough and shortness of breath.    Cardiovascular:  Negative for chest pain and leg swelling.   Gastrointestinal:  Negative for constipation, diarrhea, nausea and vomiting.   Musculoskeletal:  Negative for joint swelling and myalgias.       Objective   /72   Pulse 76   Temp 36.6 °C (97.9 °F)   Resp 18   Wt 81.2 kg (179 lb)   SpO2 93%   BMI 24.28 kg/m²     Physical Exam  Constitutional:       General: He is not in acute distress.  HENT:      Head: Normocephalic and atraumatic.   Eyes:      Conjunctiva/sclera: Conjunctivae normal.   Cardiovascular:      Rate and Rhythm: Normal rate and regular rhythm.   Pulmonary:      Effort: Pulmonary effort is normal. No respiratory distress.      Breath sounds: Normal breath sounds.   Abdominal:      General: Bowel sounds are normal. There is no distension.      Palpations: Abdomen is soft.      Tenderness: There is no abdominal tenderness.   Musculoskeletal:      Right lower leg: No edema.      Left lower leg: No edema.      Comments: diffusely decreased muscle mass  Mild left sided weakness.   Skin:      General: Skin is warm and dry.   Neurological:      General: No focal deficit present.      Mental Status: He is alert.   Psychiatric:         Mood and Affect: Mood normal.         Behavior: Behavior normal.         Assessment/Plan   Problem List Items Addressed This Visit       Atrial fibrillation, persistent (CMS/HCC)    Benign hypertension    CVA (cerebral vascular accident) (CMS/HCC)    Dementia (CMS/HCC)    Diabetes (CMS/HCC)    Heart failure with preserved ejection fraction (CMS/HCC) - Primary       A.  We will continue with restorative and supportive care as the patient tolerates    B.  Laboratory examinations will next be due in December 2023 or as needed    C.  Ongoing monitoring of the patient's ingrown toenail for further infection    D.  The patient's prognosis is guarded.

## 2023-07-31 ASSESSMENT — ENCOUNTER SYMPTOMS
DIAPHORESIS: 0
JOINT SWELLING: 0
FEVER: 0
VOMITING: 0
COUGH: 0
CONSTIPATION: 0
MYALGIAS: 0
CHILLS: 0
SHORTNESS OF BREATH: 0
NAUSEA: 0
DIARRHEA: 0

## 2023-08-01 ENCOUNTER — NURSING HOME VISIT (OUTPATIENT)
Dept: POST ACUTE CARE | Facility: EXTERNAL LOCATION | Age: 88
End: 2023-08-01
Payer: MEDICARE

## 2023-08-01 VITALS
WEIGHT: 179 LBS | SYSTOLIC BLOOD PRESSURE: 110 MMHG | BODY MASS INDEX: 24.28 KG/M2 | DIASTOLIC BLOOD PRESSURE: 74 MMHG | RESPIRATION RATE: 18 BRPM | OXYGEN SATURATION: 93 % | TEMPERATURE: 97.9 F | HEART RATE: 95 BPM

## 2023-08-01 DIAGNOSIS — I50.32 CHRONIC HEART FAILURE WITH PRESERVED EJECTION FRACTION (MULTI): ICD-10-CM

## 2023-08-01 DIAGNOSIS — E11.69 TYPE 2 DIABETES MELLITUS WITH OTHER SPECIFIED COMPLICATION, WITHOUT LONG-TERM CURRENT USE OF INSULIN (MULTI): ICD-10-CM

## 2023-08-01 DIAGNOSIS — I63.9 CEREBROVASCULAR ACCIDENT (CVA), UNSPECIFIED MECHANISM (MULTI): ICD-10-CM

## 2023-08-01 DIAGNOSIS — I48.19 ATRIAL FIBRILLATION, PERSISTENT (MULTI): Primary | ICD-10-CM

## 2023-08-01 DIAGNOSIS — N18.32 STAGE 3B CHRONIC KIDNEY DISEASE (MULTI): ICD-10-CM

## 2023-08-01 PROCEDURE — 99309 SBSQ NF CARE MODERATE MDM 30: CPT | Performed by: NURSE PRACTITIONER

## 2023-08-01 ASSESSMENT — ENCOUNTER SYMPTOMS
CHILLS: 0
COUGH: 0
FATIGUE: 0
DIARRHEA: 0
DIFFICULTY URINATING: 0
CONSTIPATION: 0
FEVER: 0
SHORTNESS OF BREATH: 0
NAUSEA: 0
ABDOMINAL PAIN: 0
VOMITING: 0
PALPITATIONS: 0

## 2023-08-01 NOTE — ASSESSMENT & PLAN NOTE
Blood sugars reviewed, acceptable.  Continue with current regimen and adjust meds based on clinical course.   On januvia, prtoein/creatinine ratio normal. . A1C 6.5 6/23, at goal

## 2023-08-01 NOTE — LETTER
Patient: Zaire Jara  : 1932    Encounter Date: 2023    Subjective  Zaire Jara is a 89 y.o. male Here for routine monthly visit.    HPI There are no new problems and multiple health problems have been reviewed.  The course has been unchanged.  The patient  is mildly confused.  There are no family members present during time of visit.    he  is resting in bed, denies any complaints when asked.  Eating and drinking well.   No concerns per staff.       Review of Systems   Constitutional:  Negative for chills, fatigue and fever.   Respiratory:  Negative for cough and shortness of breath.    Cardiovascular:  Negative for chest pain and palpitations.   Gastrointestinal:  Negative for abdominal pain, constipation, diarrhea, nausea and vomiting.   Genitourinary:  Negative for difficulty urinating.       Objective  /74   Pulse 95   Temp 36.6 °C (97.9 °F)   Resp 18   Wt 81.2 kg (179 lb)   SpO2 93%   BMI 24.28 kg/m²     Physical Exam  Constitutional:       General: He is not in acute distress.  HENT:      Head: Normocephalic and atraumatic.   Eyes:      Conjunctiva/sclera: Conjunctivae normal.   Cardiovascular:      Rate and Rhythm: Normal rate and regular rhythm.   Pulmonary:      Effort: Pulmonary effort is normal. No respiratory distress.      Breath sounds: Normal breath sounds.   Abdominal:      General: Bowel sounds are normal. There is no distension.      Palpations: Abdomen is soft.      Tenderness: There is no abdominal tenderness.   Musculoskeletal:      Right lower leg: No edema.      Left lower leg: No edema.      Comments: diffusely decreased muscle mass  Mild left sided weakness.   Skin:     General: Skin is warm and dry.   Neurological:      General: No focal deficit present.      Mental Status: He is alert.   Psychiatric:         Mood and Affect: Mood normal.         Behavior: Behavior normal.         Assessment/Plan  Problem List Items Addressed This Visit       Atrial  fibrillation, persistent (CMS/[facility]) - Primary     On eliquis,  rate controlled.  No sx of bleeding.  continue with current medical management             CVA (cerebral vascular accident) (CMS/[facility])     On statin.  continue with current medical management           Diabetes (CMS/[facility])     Blood sugars reviewed, acceptable.  Continue with current regimen and adjust meds based on clinical course.   On januvia, prtoein/creatinine ratio normal. . A1C is pending           Heart failure with preserved ejection fraction (CMS/[facility])     No symptoms of central fluid volume overload.           Stage 3 chronic kidney disease (CMS/[facility])     monitor with routine labs.  Stable on labs 6/23          labs/meds/orders reviewed  staff to monitor and notify for any changes.  continue with supportive and restorative care  next labs 12/23 and prn      Electronically Signed By: NUSRAT Keller-CNP   8/1/23 11:19 AM

## 2023-08-01 NOTE — PROGRESS NOTES
Subjective   Zaire Jara is a 89 y.o. male Here for routine monthly visit.    HPI There are no new problems and multiple health problems have been reviewed.  The course has been unchanged.  The patient  is mildly confused.  There are no family members present during time of visit.    he  is resting in bed, denies any complaints when asked.  Eating and drinking well.   No concerns per staff.       Review of Systems   Constitutional:  Negative for chills, fatigue and fever.   Respiratory:  Negative for cough and shortness of breath.    Cardiovascular:  Negative for chest pain and palpitations.   Gastrointestinal:  Negative for abdominal pain, constipation, diarrhea, nausea and vomiting.   Genitourinary:  Negative for difficulty urinating.       Objective   /74   Pulse 95   Temp 36.6 °C (97.9 °F)   Resp 18   Wt 81.2 kg (179 lb)   SpO2 93%   BMI 24.28 kg/m²     Physical Exam  Constitutional:       General: He is not in acute distress.  HENT:      Head: Normocephalic and atraumatic.   Eyes:      Conjunctiva/sclera: Conjunctivae normal.   Cardiovascular:      Rate and Rhythm: Normal rate and regular rhythm.   Pulmonary:      Effort: Pulmonary effort is normal. No respiratory distress.      Breath sounds: Normal breath sounds.   Abdominal:      General: Bowel sounds are normal. There is no distension.      Palpations: Abdomen is soft.      Tenderness: There is no abdominal tenderness.   Musculoskeletal:      Right lower leg: No edema.      Left lower leg: No edema.      Comments: diffusely decreased muscle mass  Mild left sided weakness.   Skin:     General: Skin is warm and dry.   Neurological:      General: No focal deficit present.      Mental Status: He is alert.   Psychiatric:         Mood and Affect: Mood normal.         Behavior: Behavior normal.         Assessment/Plan   Problem List Items Addressed This Visit       Atrial fibrillation, persistent (CMS/HCC) - Primary     On eliquis,  rate  controlled.  No sx of bleeding.  continue with current medical management             CVA (cerebral vascular accident) (CMS/[facility])     On statin.  continue with current medical management           Diabetes (CMS/[facility])     Blood sugars reviewed, acceptable.  Continue with current regimen and adjust meds based on clinical course.   On januvia, prtoein/creatinine ratio normal. . A1C is pending           Heart failure with preserved ejection fraction (CMS/[facility])     No symptoms of central fluid volume overload.           Stage 3 chronic kidney disease (CMS/[facility])     monitor with routine labs.  Stable on labs 6/23          labs/meds/orders reviewed  staff to monitor and notify for any changes.  continue with supportive and restorative care  next labs 12/23 and prn

## 2023-08-18 ENCOUNTER — NURSING HOME VISIT (OUTPATIENT)
Dept: POST ACUTE CARE | Facility: EXTERNAL LOCATION | Age: 88
End: 2023-08-18
Payer: MEDICARE

## 2023-08-18 VITALS
SYSTOLIC BLOOD PRESSURE: 110 MMHG | DIASTOLIC BLOOD PRESSURE: 69 MMHG | TEMPERATURE: 98.1 F | WEIGHT: 180 LBS | RESPIRATION RATE: 18 BRPM | OXYGEN SATURATION: 93 % | HEART RATE: 85 BPM | BODY MASS INDEX: 24.41 KG/M2

## 2023-08-18 DIAGNOSIS — F03.B0 MODERATE DEMENTIA WITHOUT BEHAVIORAL DISTURBANCE, PSYCHOTIC DISTURBANCE, MOOD DISTURBANCE, OR ANXIETY, UNSPECIFIED DEMENTIA TYPE (MULTI): ICD-10-CM

## 2023-08-18 DIAGNOSIS — I87.2 CHRONIC VENOUS INSUFFICIENCY: ICD-10-CM

## 2023-08-18 DIAGNOSIS — I63.9 CEREBROVASCULAR ACCIDENT (CVA), UNSPECIFIED MECHANISM (MULTI): ICD-10-CM

## 2023-08-18 DIAGNOSIS — N18.32 STAGE 3B CHRONIC KIDNEY DISEASE (MULTI): ICD-10-CM

## 2023-08-18 DIAGNOSIS — I48.19 ATRIAL FIBRILLATION, PERSISTENT (MULTI): ICD-10-CM

## 2023-08-18 DIAGNOSIS — I50.32 CHRONIC HEART FAILURE WITH PRESERVED EJECTION FRACTION (MULTI): Primary | ICD-10-CM

## 2023-08-18 PROCEDURE — 99308 SBSQ NF CARE LOW MDM 20: CPT | Performed by: INTERNAL MEDICINE

## 2023-08-18 NOTE — LETTER
Patient: Zaire Jara  : 1932    Encounter Date: 2023    Subjective  Patient ID: Zaire Jara is a 89 y.o. male who is long term resident being seen and evaluated for multiple medical problems.    HPI   This 89-year-old male patient who is resting comfortably in his bed in no distress today.  He has no complaints of shortness of breath or pain for me.  Nursing has no new adverse events reported to me and reports that the patient is doing stable at this time.    Current tremors medication:  Aldactone  Allopurinol  Eliquis  Iron  Tamsulosin  Januvia    Laboratory examination from 2023:  Potassium 4.5  Bicarbonate 23  Creatinine 1.5  GFR 44  Albumin 3.4  Liver injury test normal  A1c 6.5 on no insulin therapy  Hemoglobin 11.0  Vitamin B12 519  TSH 1.16  Vitamin D 33.4    Review of Systems   Constitutional:  Negative for chills, fatigue and fever.   Respiratory:  Negative for cough and shortness of breath.    Cardiovascular:  Negative for chest pain and palpitations.   Gastrointestinal:  Negative for abdominal pain, constipation, diarrhea, nausea and vomiting.   Genitourinary:  Negative for difficulty urinating.       Objective  /69   Pulse 85   Temp 36.7 °C (98.1 °F)   Resp 18   Wt 81.6 kg (180 lb)   SpO2 93%   BMI 24.41 kg/m²     Physical Exam  Constitutional:       General: He is not in acute distress.  HENT:      Head: Normocephalic and atraumatic.   Eyes:      Conjunctiva/sclera: Conjunctivae normal.   Cardiovascular:      Rate and Rhythm: Normal rate and regular rhythm.   Pulmonary:      Effort: Pulmonary effort is normal. No respiratory distress.      Breath sounds: Normal breath sounds.   Abdominal:      General: Bowel sounds are normal. There is no distension.      Palpations: Abdomen is soft.      Tenderness: There is no abdominal tenderness.   Musculoskeletal:      Right lower leg: No edema.      Left lower leg: No edema.      Comments: diffusely decreased muscle  mass  Mild left sided weakness.   Skin:     General: Skin is warm and dry.   Neurological:      General: No focal deficit present.      Mental Status: He is alert.   Psychiatric:         Mood and Affect: Mood normal.         Behavior: Behavior normal.         Assessment/Plan  Problem List Items Addressed This Visit       Atrial fibrillation, persistent (CMS/HCC)    Chronic venous insufficiency    CVA (cerebral vascular accident) (CMS/HCC)    Dementia (CMS/HCC)    Heart failure with preserved ejection fraction (CMS/[facility]) - Primary    Stage 3 chronic kidney disease (CMS/[facility])       A.  We will continue with restorative and supportive care as patient tolerates    B.  Laboratory examinations next be due in February 2024 or as needed    C.  The patient's prognosis is fair-2-guarded.      Electronically Signed By: True Garrido MD   8/28/23  3:50 PM

## 2023-08-18 NOTE — PROGRESS NOTES
Subjective   Patient ID: Zaire Jara is a 89 y.o. male who is long term resident being seen and evaluated for multiple medical problems.    HPI   This 89-year-old male patient who is resting comfortably in his bed in no distress today.  He has no complaints of shortness of breath or pain for me.  Nursing has no new adverse events reported to me and reports that the patient is doing stable at this time.    Current tremors medication:  Aldactone  Allopurinol  Eliquis  Iron  Tamsulosin  Januvia    Laboratory examination from August 2023:  Potassium 4.5  Bicarbonate 23  Creatinine 1.5  GFR 44  Albumin 3.4  Liver injury test normal  A1c 6.5 on no insulin therapy  Hemoglobin 11.0  Vitamin B12 519  TSH 1.16  Vitamin D 33.4    Review of Systems   Constitutional:  Negative for chills, fatigue and fever.   Respiratory:  Negative for cough and shortness of breath.    Cardiovascular:  Negative for chest pain and palpitations.   Gastrointestinal:  Negative for abdominal pain, constipation, diarrhea, nausea and vomiting.   Genitourinary:  Negative for difficulty urinating.       Objective   /69   Pulse 85   Temp 36.7 °C (98.1 °F)   Resp 18   Wt 81.6 kg (180 lb)   SpO2 93%   BMI 24.41 kg/m²     Physical Exam  Constitutional:       General: He is not in acute distress.  HENT:      Head: Normocephalic and atraumatic.   Eyes:      Conjunctiva/sclera: Conjunctivae normal.   Cardiovascular:      Rate and Rhythm: Normal rate and regular rhythm.   Pulmonary:      Effort: Pulmonary effort is normal. No respiratory distress.      Breath sounds: Normal breath sounds.   Abdominal:      General: Bowel sounds are normal. There is no distension.      Palpations: Abdomen is soft.      Tenderness: There is no abdominal tenderness.   Musculoskeletal:      Right lower leg: No edema.      Left lower leg: No edema.      Comments: diffusely decreased muscle mass  Mild left sided weakness.   Skin:     General: Skin is warm and dry.    Neurological:      General: No focal deficit present.      Mental Status: He is alert.   Psychiatric:         Mood and Affect: Mood normal.         Behavior: Behavior normal.         Assessment/Plan   Problem List Items Addressed This Visit       Atrial fibrillation, persistent (CMS/HCC)    Chronic venous insufficiency    CVA (cerebral vascular accident) (CMS/HCC)    Dementia (CMS/HCC)    Heart failure with preserved ejection fraction (CMS/HCC) - Primary    Stage 3 chronic kidney disease (CMS/HCC)       A.  We will continue with restorative and supportive care as patient tolerates    B.  Laboratory examinations next be due in February 2024 or as needed    C.  The patient's prognosis is fair-2-guarded.

## 2023-08-28 ASSESSMENT — ENCOUNTER SYMPTOMS
ABDOMINAL PAIN: 0
VOMITING: 0
NAUSEA: 0
DIARRHEA: 0
CONSTIPATION: 0
FEVER: 0
PALPITATIONS: 0
SHORTNESS OF BREATH: 0
DIFFICULTY URINATING: 0
FATIGUE: 0
CHILLS: 0
COUGH: 0

## 2023-09-15 ENCOUNTER — NURSING HOME VISIT (OUTPATIENT)
Dept: POST ACUTE CARE | Facility: EXTERNAL LOCATION | Age: 88
End: 2023-09-15
Payer: MEDICARE

## 2023-09-15 DIAGNOSIS — F03.B0 MODERATE DEMENTIA WITHOUT BEHAVIORAL DISTURBANCE, PSYCHOTIC DISTURBANCE, MOOD DISTURBANCE, OR ANXIETY, UNSPECIFIED DEMENTIA TYPE (MULTI): ICD-10-CM

## 2023-09-15 DIAGNOSIS — I50.32 CHRONIC HEART FAILURE WITH PRESERVED EJECTION FRACTION (MULTI): Primary | ICD-10-CM

## 2023-09-15 DIAGNOSIS — M17.12 PRIMARY OSTEOARTHRITIS OF LEFT KNEE: ICD-10-CM

## 2023-09-15 DIAGNOSIS — I48.19 ATRIAL FIBRILLATION, PERSISTENT (MULTI): ICD-10-CM

## 2023-09-15 DIAGNOSIS — I63.9 CEREBROVASCULAR ACCIDENT (CVA), UNSPECIFIED MECHANISM (MULTI): ICD-10-CM

## 2023-09-15 PROCEDURE — 99308 SBSQ NF CARE LOW MDM 20: CPT | Performed by: INTERNAL MEDICINE

## 2023-09-15 NOTE — LETTER
Patient: Zaire Jara  : 1932    Encounter Date: 09/15/2023    Subjective  Patient ID: Zaire Jara is a 89 y.o. male who is long term resident being seen and evaluated for multiple medical problems.    HPI   This 89-year-old male patient is up in his room in his wheelchair without any complaints.  He does report that his knee hurts chronically and has noises admitting from it when he moves it.  He has received recent knee injection by orthopedic surgery with some improvement in ability to ambulate and participate in physical therapy.  Nursing has no new adverse events reported to me.    Current high risk medication:  Aldactone  Allopurinol  Eliquis  Iron  Tamsulosin  Januvia    Laboratory examination from 2023:  Potassium 4.5  Bicarbonate 23  Creatinine 1.5  Albumin 3.4  Liver tests normal  A1c 6.5  Hemoglobin 11.0  B12 512  TSH 1.62  Vitamin D 33    Review of Systems   Constitutional:  Negative for chills, fatigue and fever.   Respiratory:  Negative for cough and shortness of breath.    Cardiovascular:  Negative for chest pain and palpitations.   Gastrointestinal:  Negative for abdominal pain, constipation, diarrhea, nausea and vomiting.   Genitourinary:  Negative for difficulty urinating.       Objective  BP (!) 132/93   Pulse 80   Temp 36.3 °C (97.3 °F)   Resp 18   Wt 81.2 kg (179 lb)   SpO2 96%   BMI 24.30 kg/m²     Physical Exam  Constitutional:       General: He is not in acute distress.  HENT:      Head: Normocephalic and atraumatic.   Eyes:      Conjunctiva/sclera: Conjunctivae normal.   Cardiovascular:      Rate and Rhythm: Normal rate and regular rhythm.   Pulmonary:      Effort: Pulmonary effort is normal. No respiratory distress.      Breath sounds: Normal breath sounds.   Abdominal:      General: Bowel sounds are normal. There is no distension.      Palpations: Abdomen is soft.      Tenderness: There is no abdominal tenderness.   Musculoskeletal:      Right lower leg: No edema.       Left lower leg: No edema.      Comments: diffusely decreased muscle mass  Mild left sided weakness.  There is audible crepitus emitting from his left knee with any range of motion   Skin:     General: Skin is warm and dry.   Neurological:      General: No focal deficit present.      Mental Status: He is alert.   Psychiatric:         Mood and Affect: Mood normal.         Behavior: Behavior normal.         Assessment/Plan  Problem List Items Addressed This Visit       Atrial fibrillation, persistent (CMS/HCC)    CVA (cerebral vascular accident) (CMS/HCC)    Dementia (CMS/HCC)    Heart failure with preserved ejection fraction (CMS/HCC) - Primary    Primary osteoarthritis of left knee     8.  We will continue with restorative and supportive care as patient tolerates    B.  Laboratory examinations will next be due in February 2024 or as needed    C.  The patient's prognosis is guarded.        Electronically Signed By: True Garrido MD   9/22/23  4:51 PM

## 2023-09-20 VITALS
TEMPERATURE: 97.3 F | WEIGHT: 179 LBS | DIASTOLIC BLOOD PRESSURE: 93 MMHG | RESPIRATION RATE: 18 BRPM | BODY MASS INDEX: 24.3 KG/M2 | HEART RATE: 80 BPM | OXYGEN SATURATION: 96 % | SYSTOLIC BLOOD PRESSURE: 132 MMHG

## 2023-09-20 NOTE — PROGRESS NOTES
Subjective   Patient ID: Zaire Jara is a 89 y.o. male who is long term resident being seen and evaluated for multiple medical problems.    HPI   This 89-year-old male patient is up in his room in his wheelchair without any complaints.  He does report that his knee hurts chronically and has noises admitting from it when he moves it.  He has received recent knee injection by orthopedic surgery with some improvement in ability to ambulate and participate in physical therapy.  Nursing has no new adverse events reported to me.    Current high risk medication:  Aldactone  Allopurinol  Eliquis  Iron  Tamsulosin  Januvia    Laboratory examination from August 2023:  Potassium 4.5  Bicarbonate 23  Creatinine 1.5  Albumin 3.4  Liver tests normal  A1c 6.5  Hemoglobin 11.0  B12 512  TSH 1.62  Vitamin D 33    Review of Systems   Constitutional:  Negative for chills, fatigue and fever.   Respiratory:  Negative for cough and shortness of breath.    Cardiovascular:  Negative for chest pain and palpitations.   Gastrointestinal:  Negative for abdominal pain, constipation, diarrhea, nausea and vomiting.   Genitourinary:  Negative for difficulty urinating.       Objective   BP (!) 132/93   Pulse 80   Temp 36.3 °C (97.3 °F)   Resp 18   Wt 81.2 kg (179 lb)   SpO2 96%   BMI 24.30 kg/m²     Physical Exam  Constitutional:       General: He is not in acute distress.  HENT:      Head: Normocephalic and atraumatic.   Eyes:      Conjunctiva/sclera: Conjunctivae normal.   Cardiovascular:      Rate and Rhythm: Normal rate and regular rhythm.   Pulmonary:      Effort: Pulmonary effort is normal. No respiratory distress.      Breath sounds: Normal breath sounds.   Abdominal:      General: Bowel sounds are normal. There is no distension.      Palpations: Abdomen is soft.      Tenderness: There is no abdominal tenderness.   Musculoskeletal:      Right lower leg: No edema.      Left lower leg: No edema.      Comments: diffusely decreased  muscle mass  Mild left sided weakness.  There is audible crepitus emitting from his left knee with any range of motion   Skin:     General: Skin is warm and dry.   Neurological:      General: No focal deficit present.      Mental Status: He is alert.   Psychiatric:         Mood and Affect: Mood normal.         Behavior: Behavior normal.         Assessment/Plan   Problem List Items Addressed This Visit       Atrial fibrillation, persistent (CMS/HCC)    CVA (cerebral vascular accident) (CMS/HCC)    Dementia (CMS/HCC)    Heart failure with preserved ejection fraction (CMS/HCC) - Primary    Primary osteoarthritis of left knee     8.  We will continue with restorative and supportive care as patient tolerates    B.  Laboratory examinations will next be due in February 2024 or as needed    C.  The patient's prognosis is guarded.

## 2023-09-22 PROBLEM — M17.12 PRIMARY OSTEOARTHRITIS OF LEFT KNEE: Status: ACTIVE | Noted: 2023-09-22

## 2023-09-22 ASSESSMENT — ENCOUNTER SYMPTOMS
FEVER: 0
FATIGUE: 0
NAUSEA: 0
SHORTNESS OF BREATH: 0
ABDOMINAL PAIN: 0
CONSTIPATION: 0
DIFFICULTY URINATING: 0
COUGH: 0
PALPITATIONS: 0
DIARRHEA: 0
CHILLS: 0
VOMITING: 0

## 2023-10-19 ENCOUNTER — NURSING HOME VISIT (OUTPATIENT)
Dept: POST ACUTE CARE | Facility: EXTERNAL LOCATION | Age: 88
End: 2023-10-19
Payer: MEDICARE

## 2023-10-19 DIAGNOSIS — M17.12 PRIMARY OSTEOARTHRITIS OF LEFT KNEE: ICD-10-CM

## 2023-10-19 DIAGNOSIS — E11.69 TYPE 2 DIABETES MELLITUS WITH OTHER SPECIFIED COMPLICATION, WITHOUT LONG-TERM CURRENT USE OF INSULIN (MULTI): ICD-10-CM

## 2023-10-19 DIAGNOSIS — I10 BENIGN HYPERTENSION: ICD-10-CM

## 2023-10-19 DIAGNOSIS — F03.B0 MODERATE DEMENTIA WITHOUT BEHAVIORAL DISTURBANCE, PSYCHOTIC DISTURBANCE, MOOD DISTURBANCE, OR ANXIETY, UNSPECIFIED DEMENTIA TYPE (MULTI): ICD-10-CM

## 2023-10-19 DIAGNOSIS — I50.32 CHRONIC HEART FAILURE WITH PRESERVED EJECTION FRACTION (MULTI): Primary | ICD-10-CM

## 2023-10-19 DIAGNOSIS — I48.19 ATRIAL FIBRILLATION, PERSISTENT (MULTI): ICD-10-CM

## 2023-10-19 DIAGNOSIS — I63.9 CEREBROVASCULAR ACCIDENT (CVA), UNSPECIFIED MECHANISM (MULTI): ICD-10-CM

## 2023-10-19 PROCEDURE — 99308 SBSQ NF CARE LOW MDM 20: CPT | Performed by: INTERNAL MEDICINE

## 2023-10-19 NOTE — LETTER
Patient: Zaire Jara  : 1932    Encounter Date: 10/19/2023    Subjective  Patient ID: Zaire Jara is a 89 y.o. male who is long term resident being seen and evaluated for multiple medical problems.    HPI   89-year-old male patient is resting comfortably in his room in his wheelchair in no distress.  He has had another injection of his knee which did not do much to help him.  The patient has audible crepitus across the room with any knee movement.  Nursing has no new adverse events reported to me.    Current high risk medication:  Aldactone  Allopurinol  Eliquis  Iron  Tamsulosin  Januvia    Current laboratory examination from 2023:  Potassium 4.5  Bicarbonate 23  Creatinine 1.5  Albumin 3.4  Liver injury test normal  Hemoglobin A1c 6.5  Hemoglobin 11.0  Vitamin B12 519  TSH 1.6  Vitamin D 33    Review of Systems   Constitutional:  Negative for chills, fatigue and fever.   Respiratory:  Negative for cough and shortness of breath.    Cardiovascular:  Negative for chest pain and palpitations.   Gastrointestinal:  Negative for abdominal pain, constipation, diarrhea, nausea and vomiting.   Genitourinary:  Negative for difficulty urinating.       Objective  /74   Pulse 72   Temp 36.7 °C (98.1 °F)   Resp 18   Wt 81.2 kg (179 lb)   SpO2 96%   BMI 24.30 kg/m²     Physical Exam  Constitutional:       General: He is not in acute distress.  HENT:      Head: Normocephalic and atraumatic.   Eyes:      Conjunctiva/sclera: Conjunctivae normal.   Cardiovascular:      Rate and Rhythm: Normal rate and regular rhythm.   Pulmonary:      Effort: Pulmonary effort is normal. No respiratory distress.      Breath sounds: Normal breath sounds.   Abdominal:      General: Bowel sounds are normal. There is no distension.      Palpations: Abdomen is soft.      Tenderness: There is no abdominal tenderness.   Musculoskeletal:      Right lower leg: No edema.      Left lower leg: No edema.      Comments: diffusely  decreased muscle mass  Mild left sided weakness.  There is audible crepitus emitting from his left knee with any range of motion   Skin:     General: Skin is warm and dry.   Neurological:      General: No focal deficit present.      Mental Status: He is alert.   Psychiatric:         Mood and Affect: Mood normal.         Behavior: Behavior normal.         Assessment/Plan  Problem List Items Addressed This Visit             ICD-10-CM    Atrial fibrillation, persistent (CMS/[facility]) I48.19    Benign hypertension I10    CVA (cerebral vascular accident) (CMS/[facility]) I63.9    Dementia (CMS/[facility]) F03.90    Diabetes (CMS/[facility]) E11.9    Heart failure with preserved ejection fraction (CMS/HCC) - Primary I50.30    Primary osteoarthritis of left knee M17.12     A.  We will continue with restorative and supportive care as the patient tolerates    B.  Laboratory examinations next be due in February 2024 or as needed    C.  The patient's prognosis is guarded.  The audible crepitus in the left knee is quite extreme and unexpected.  It is doubtful with this degree of osteoarthritis and the lack of cartilage that ongoing corticosteroid injections will be of much benefit whatsoever.  He could potentially benefit from lubricated gel shots but that is also questionable in my mind.    D.  The patient's prognosis is guarded.        Electronically Signed By: True Garrido MD   10/26/23  3:40 PM

## 2023-10-24 VITALS
TEMPERATURE: 98.1 F | HEART RATE: 72 BPM | RESPIRATION RATE: 18 BRPM | BODY MASS INDEX: 24.3 KG/M2 | SYSTOLIC BLOOD PRESSURE: 122 MMHG | OXYGEN SATURATION: 96 % | WEIGHT: 179 LBS | DIASTOLIC BLOOD PRESSURE: 74 MMHG

## 2023-10-24 NOTE — PROGRESS NOTES
Subjective   Patient ID: Zaire Jara is a 89 y.o. male who is long term resident being seen and evaluated for multiple medical problems.    HPI   89-year-old male patient is resting comfortably in his room in his wheelchair in no distress.  He has had another injection of his knee which did not do much to help him.  The patient has audible crepitus across the room with any knee movement.  Nursing has no new adverse events reported to me.    Current high risk medication:  Aldactone  Allopurinol  Eliquis  Iron  Tamsulosin  Januvia    Current laboratory examination from August 2023:  Potassium 4.5  Bicarbonate 23  Creatinine 1.5  Albumin 3.4  Liver injury test normal  Hemoglobin A1c 6.5  Hemoglobin 11.0  Vitamin B12 519  TSH 1.6  Vitamin D 33    Review of Systems   Constitutional:  Negative for chills, fatigue and fever.   Respiratory:  Negative for cough and shortness of breath.    Cardiovascular:  Negative for chest pain and palpitations.   Gastrointestinal:  Negative for abdominal pain, constipation, diarrhea, nausea and vomiting.   Genitourinary:  Negative for difficulty urinating.       Objective   /74   Pulse 72   Temp 36.7 °C (98.1 °F)   Resp 18   Wt 81.2 kg (179 lb)   SpO2 96%   BMI 24.30 kg/m²     Physical Exam  Constitutional:       General: He is not in acute distress.  HENT:      Head: Normocephalic and atraumatic.   Eyes:      Conjunctiva/sclera: Conjunctivae normal.   Cardiovascular:      Rate and Rhythm: Normal rate and regular rhythm.   Pulmonary:      Effort: Pulmonary effort is normal. No respiratory distress.      Breath sounds: Normal breath sounds.   Abdominal:      General: Bowel sounds are normal. There is no distension.      Palpations: Abdomen is soft.      Tenderness: There is no abdominal tenderness.   Musculoskeletal:      Right lower leg: No edema.      Left lower leg: No edema.      Comments: diffusely decreased muscle mass  Mild left sided weakness.  There is audible  crepitus emitting from his left knee with any range of motion   Skin:     General: Skin is warm and dry.   Neurological:      General: No focal deficit present.      Mental Status: He is alert.   Psychiatric:         Mood and Affect: Mood normal.         Behavior: Behavior normal.         Assessment/Plan   Problem List Items Addressed This Visit             ICD-10-CM    Atrial fibrillation, persistent (CMS/HCC) I48.19    Benign hypertension I10    CVA (cerebral vascular accident) (CMS/[facility]) I63.9    Dementia (CMS/HCC) F03.90    Diabetes (CMS/HCC) E11.9    Heart failure with preserved ejection fraction (CMS/HCC) - Primary I50.30    Primary osteoarthritis of left knee M17.12     A.  We will continue with restorative and supportive care as the patient tolerates    B.  Laboratory examinations next be due in February 2024 or as needed    C.  The patient's prognosis is guarded.  The audible crepitus in the left knee is quite extreme and unexpected.  It is doubtful with this degree of osteoarthritis and the lack of cartilage that ongoing corticosteroid injections will be of much benefit whatsoever.  He could potentially benefit from lubricated gel shots but that is also questionable in my mind.    D.  The patient's prognosis is guarded.

## 2023-10-26 ASSESSMENT — ENCOUNTER SYMPTOMS
DIARRHEA: 0
FATIGUE: 0
NAUSEA: 0
SHORTNESS OF BREATH: 0
CHILLS: 0
CONSTIPATION: 0
VOMITING: 0
ABDOMINAL PAIN: 0
DIFFICULTY URINATING: 0
PALPITATIONS: 0
COUGH: 0
FEVER: 0

## 2023-10-31 ENCOUNTER — NURSING HOME VISIT (OUTPATIENT)
Dept: POST ACUTE CARE | Facility: EXTERNAL LOCATION | Age: 88
End: 2023-10-31
Payer: MEDICARE

## 2023-10-31 VITALS
BODY MASS INDEX: 24.3 KG/M2 | OXYGEN SATURATION: 96 % | WEIGHT: 179 LBS | TEMPERATURE: 98.1 F | HEART RATE: 80 BPM | RESPIRATION RATE: 18 BRPM | DIASTOLIC BLOOD PRESSURE: 74 MMHG | SYSTOLIC BLOOD PRESSURE: 110 MMHG

## 2023-10-31 DIAGNOSIS — I63.9 CEREBROVASCULAR ACCIDENT (CVA), UNSPECIFIED MECHANISM (MULTI): ICD-10-CM

## 2023-10-31 DIAGNOSIS — I48.19 ATRIAL FIBRILLATION, PERSISTENT (MULTI): Primary | ICD-10-CM

## 2023-10-31 DIAGNOSIS — F03.B0 MODERATE DEMENTIA WITHOUT BEHAVIORAL DISTURBANCE, PSYCHOTIC DISTURBANCE, MOOD DISTURBANCE, OR ANXIETY, UNSPECIFIED DEMENTIA TYPE (MULTI): ICD-10-CM

## 2023-10-31 DIAGNOSIS — E11.69 TYPE 2 DIABETES MELLITUS WITH OTHER SPECIFIED COMPLICATION, WITHOUT LONG-TERM CURRENT USE OF INSULIN (MULTI): ICD-10-CM

## 2023-10-31 PROCEDURE — 99309 SBSQ NF CARE MODERATE MDM 30: CPT | Performed by: NURSE PRACTITIONER

## 2023-10-31 ASSESSMENT — ENCOUNTER SYMPTOMS
DIFFICULTY URINATING: 0
FEVER: 0
CONSTIPATION: 0
DIARRHEA: 0
NAUSEA: 0
VOMITING: 0
FATIGUE: 0
PALPITATIONS: 0
ABDOMINAL PAIN: 0
CHILLS: 0
COUGH: 0
SHORTNESS OF BREATH: 0

## 2023-10-31 NOTE — PROGRESS NOTES
Subjective   Zaire Jara is a 89 y.o. male Here for routine monthly visit.    HPI There are no new problems and multiple health problems have been reviewed.  The course has been unchanged.  The patient  is mildly confused.  There are no family members present during time of visit.    he  is sitting up in chair, denies any complaints when asked but states the last injection to knee did not improve his sx.  Eating and drinking well.   No concerns per staff.       Review of Systems   Constitutional:  Negative for chills, fatigue and fever.   Respiratory:  Negative for cough and shortness of breath.    Cardiovascular:  Negative for chest pain and palpitations.   Gastrointestinal:  Negative for abdominal pain, constipation, diarrhea, nausea and vomiting.   Genitourinary:  Negative for difficulty urinating.       Objective   /74   Pulse 80   Temp 36.7 °C (98.1 °F)   Resp 18   Wt 81.2 kg (179 lb)   SpO2 96%   BMI 24.30 kg/m²     Physical Exam  Constitutional:       General: He is not in acute distress.  HENT:      Head: Normocephalic and atraumatic.   Eyes:      Conjunctiva/sclera: Conjunctivae normal.   Cardiovascular:      Rate and Rhythm: Normal rate and regular rhythm.   Pulmonary:      Effort: Pulmonary effort is normal. No respiratory distress.      Breath sounds: Normal breath sounds.   Abdominal:      General: Bowel sounds are normal. There is no distension.      Palpations: Abdomen is soft.      Tenderness: There is no abdominal tenderness.   Musculoskeletal:      Right lower leg: No edema.      Left lower leg: No edema.      Comments: diffusely decreased muscle mass  Mild left sided weakness.   Skin:     General: Skin is warm and dry.   Neurological:      General: No focal deficit present.      Mental Status: He is alert.   Psychiatric:         Mood and Affect: Mood normal.         Behavior: Behavior normal.         Assessment/Plan   Problem List Items Addressed This Visit       Atrial  fibrillation, persistent (CMS/HCC) - Primary     On eliquis,  rate controlled.  No sx of bleeding.  continue with current medical management             CVA (cerebral vascular accident) (CMS/HCC)     On statin.  continue with current medical management           Dementia (CMS/HCC)     Mild, forgetful.           Diabetes (CMS/[facility])     Blood sugars reviewed, acceptable.  Continue with current regimen and adjust meds based on clinical course.   On januvia,  A1C 6.5 8/23, at goal          labs/meds/orders reviewed  staff to monitor and notify for any changes.  continue with supportive and restorative care  next labs 12/23 and prn

## 2023-10-31 NOTE — ASSESSMENT & PLAN NOTE
Blood sugars reviewed, acceptable.  Continue with current regimen and adjust meds based on clinical course.   On januvia,  A1C 6.5 6/23, at goal

## 2023-10-31 NOTE — LETTER
Patient: Zaire Jara  : 1932    Encounter Date: 10/31/2023    Subjective  Zaire Jara is a 89 y.o. male Here for routine monthly visit.    HPI There are no new problems and multiple health problems have been reviewed.  The course has been unchanged.  The patient  is mildly confused.  There are no family members present during time of visit.    he  is sitting up in chair, denies any complaints when asked but states the last injection to knee did not improve his sx.  Eating and drinking well.   No concerns per staff.       Review of Systems   Constitutional:  Negative for chills, fatigue and fever.   Respiratory:  Negative for cough and shortness of breath.    Cardiovascular:  Negative for chest pain and palpitations.   Gastrointestinal:  Negative for abdominal pain, constipation, diarrhea, nausea and vomiting.   Genitourinary:  Negative for difficulty urinating.       Objective  /74   Pulse 80   Temp 36.7 °C (98.1 °F)   Resp 18   Wt 81.2 kg (179 lb)   SpO2 96%   BMI 24.30 kg/m²     Physical Exam  Constitutional:       General: He is not in acute distress.  HENT:      Head: Normocephalic and atraumatic.   Eyes:      Conjunctiva/sclera: Conjunctivae normal.   Cardiovascular:      Rate and Rhythm: Normal rate and regular rhythm.   Pulmonary:      Effort: Pulmonary effort is normal. No respiratory distress.      Breath sounds: Normal breath sounds.   Abdominal:      General: Bowel sounds are normal. There is no distension.      Palpations: Abdomen is soft.      Tenderness: There is no abdominal tenderness.   Musculoskeletal:      Right lower leg: No edema.      Left lower leg: No edema.      Comments: diffusely decreased muscle mass  Mild left sided weakness.   Skin:     General: Skin is warm and dry.   Neurological:      General: No focal deficit present.      Mental Status: He is alert.   Psychiatric:         Mood and Affect: Mood normal.         Behavior: Behavior normal.          Assessment/Plan  Problem List Items Addressed This Visit       Atrial fibrillation, persistent (CMS/HCC) - Primary     On eliquis,  rate controlled.  No sx of bleeding.  continue with current medical management             CVA (cerebral vascular accident) (CMS/HCC)     On statin.  continue with current medical management           Dementia (CMS/HCC)     Mild, forgetful.           Diabetes (CMS/[facility])     Blood sugars reviewed, acceptable.  Continue with current regimen and adjust meds based on clinical course.   On januvia,  A1C 6.5 8/23, at goal          labs/meds/orders reviewed  staff to monitor and notify for any changes.  continue with supportive and restorative care  next labs 12/23 and prn      Electronically Signed By: NUSRAT Keller-CNP   10/31/23  1:31 PM

## 2023-11-17 ENCOUNTER — NURSING HOME VISIT (OUTPATIENT)
Dept: POST ACUTE CARE | Facility: EXTERNAL LOCATION | Age: 88
End: 2023-11-17
Payer: MEDICARE

## 2023-11-17 VITALS
BODY MASS INDEX: 24.7 KG/M2 | RESPIRATION RATE: 18 BRPM | WEIGHT: 182 LBS | OXYGEN SATURATION: 96 % | HEART RATE: 97 BPM | SYSTOLIC BLOOD PRESSURE: 112 MMHG | DIASTOLIC BLOOD PRESSURE: 63 MMHG | TEMPERATURE: 98.2 F

## 2023-11-17 DIAGNOSIS — I48.19 ATRIAL FIBRILLATION, PERSISTENT (MULTI): ICD-10-CM

## 2023-11-17 DIAGNOSIS — I10 BENIGN HYPERTENSION: ICD-10-CM

## 2023-11-17 DIAGNOSIS — M17.12 PRIMARY OSTEOARTHRITIS OF LEFT KNEE: ICD-10-CM

## 2023-11-17 DIAGNOSIS — I63.9 CEREBROVASCULAR ACCIDENT (CVA), UNSPECIFIED MECHANISM (MULTI): Primary | ICD-10-CM

## 2023-11-17 DIAGNOSIS — I50.32 CHRONIC HEART FAILURE WITH PRESERVED EJECTION FRACTION (MULTI): ICD-10-CM

## 2023-11-17 DIAGNOSIS — E11.69 TYPE 2 DIABETES MELLITUS WITH OTHER SPECIFIED COMPLICATION, WITHOUT LONG-TERM CURRENT USE OF INSULIN (MULTI): ICD-10-CM

## 2023-11-17 PROCEDURE — 99308 SBSQ NF CARE LOW MDM 20: CPT | Performed by: INTERNAL MEDICINE

## 2023-11-17 NOTE — LETTER
Patient: Zaire Jara  : 1932    Encounter Date: 2023    Subjective  Patient ID: Zaire Jara is a 89 y.o. male who is long term resident being seen and evaluated for multiple medical problems.    HPI   This 89-year-old male patient is resting comfortably in his wheelchair in no distress.  He has no new complaints and continues to have his difficulties with knee arthritis.  Nursing has no new adverse events reported to me at this time.     Current high risk medication:  Aldactone  Allopurinol  Eliquis  Iron  Flomax  Furosemide    Laboratory examination from 2023:  Potassium 4.3  Bicarbonate 23  Creatinine 1.5  Albumin 3.4  Liver injury test normal  A1c 6.5  Hemoglobin 11  Vitamin B12 519  TSH 1.6  Vitamin D 33    Review of Systems   Constitutional:  Negative for chills, fatigue and fever.   Respiratory:  Negative for cough and shortness of breath.    Cardiovascular:  Negative for chest pain and palpitations.   Gastrointestinal:  Negative for abdominal pain, constipation, diarrhea, nausea and vomiting.   Genitourinary:  Negative for difficulty urinating.       Objective  /63   Pulse 97   Temp 36.8 °C (98.2 °F)   Resp 18   Wt 82.6 kg (182 lb)   SpO2 96%   BMI 24.70 kg/m²     Physical Exam  Constitutional:       General: He is not in acute distress.  HENT:      Head: Normocephalic and atraumatic.   Eyes:      Conjunctiva/sclera: Conjunctivae normal.   Cardiovascular:      Rate and Rhythm: Normal rate and regular rhythm.   Pulmonary:      Effort: Pulmonary effort is normal. No respiratory distress.      Breath sounds: Normal breath sounds.   Abdominal:      General: Bowel sounds are normal. There is no distension.      Palpations: Abdomen is soft.      Tenderness: There is no abdominal tenderness.   Musculoskeletal:      Right lower leg: No edema.      Left lower leg: No edema.      Comments: diffusely decreased muscle mass  Mild left sided weakness.   Skin:     General: Skin is warm  and dry.   Neurological:      General: No focal deficit present.      Mental Status: He is alert.   Psychiatric:         Mood and Affect: Mood normal.         Behavior: Behavior normal.         Assessment/Plan  Problem List Items Addressed This Visit             ICD-10-CM    Atrial fibrillation, persistent (CMS/[facility]) I48.19    Benign hypertension I10    CVA (cerebral vascular accident) (CMS/[facility]) - Primary I63.9    Diabetes (CMS/[facility]) E11.9    Heart failure with preserved ejection fraction (CMS/[facility]) I50.30    Primary osteoarthritis of left knee M17.12       A.  We will continue with restorative and supportive care as the patient tolerates    B.  Laboratory examinations likely due in February 2024 or as needed    C.  The patient's prognosis is guarded.      Electronically Signed By: True Garrido MD   11/29/23  5:19 PM

## 2023-11-17 NOTE — PROGRESS NOTES
Subjective   Patient ID: Zaire Jara is a 89 y.o. male who is long term resident being seen and evaluated for multiple medical problems.    HPI   This 89-year-old male patient is resting comfortably in his wheelchair in no distress.  He has no new complaints and continues to have his difficulties with knee arthritis.  Nursing has no new adverse events reported to me at this time.     Current high risk medication:  Aldactone  Allopurinol  Eliquis  Iron  Flomax  Furosemide    Laboratory examination from August 2023:  Potassium 4.3  Bicarbonate 23  Creatinine 1.5  Albumin 3.4  Liver injury test normal  A1c 6.5  Hemoglobin 11  Vitamin B12 519  TSH 1.6  Vitamin D 33    Review of Systems   Constitutional:  Negative for chills, fatigue and fever.   Respiratory:  Negative for cough and shortness of breath.    Cardiovascular:  Negative for chest pain and palpitations.   Gastrointestinal:  Negative for abdominal pain, constipation, diarrhea, nausea and vomiting.   Genitourinary:  Negative for difficulty urinating.       Objective   /63   Pulse 97   Temp 36.8 °C (98.2 °F)   Resp 18   Wt 82.6 kg (182 lb)   SpO2 96%   BMI 24.70 kg/m²     Physical Exam  Constitutional:       General: He is not in acute distress.  HENT:      Head: Normocephalic and atraumatic.   Eyes:      Conjunctiva/sclera: Conjunctivae normal.   Cardiovascular:      Rate and Rhythm: Normal rate and regular rhythm.   Pulmonary:      Effort: Pulmonary effort is normal. No respiratory distress.      Breath sounds: Normal breath sounds.   Abdominal:      General: Bowel sounds are normal. There is no distension.      Palpations: Abdomen is soft.      Tenderness: There is no abdominal tenderness.   Musculoskeletal:      Right lower leg: No edema.      Left lower leg: No edema.      Comments: diffusely decreased muscle mass  Mild left sided weakness.   Skin:     General: Skin is warm and dry.   Neurological:      General: No focal deficit present.       Mental Status: He is alert.   Psychiatric:         Mood and Affect: Mood normal.         Behavior: Behavior normal.         Assessment/Plan   Problem List Items Addressed This Visit             ICD-10-CM    Atrial fibrillation, persistent (CMS/[facility]) I48.19    Benign hypertension I10    CVA (cerebral vascular accident) (CMS/[facility]) - Primary I63.9    Diabetes (CMS/[facility]) E11.9    Heart failure with preserved ejection fraction (CMS/[facility]) I50.30    Primary osteoarthritis of left knee M17.12       A.  We will continue with restorative and supportive care as the patient tolerates    B.  Laboratory examinations likely due in February 2024 or as needed    C.  The patient's prognosis is guarded.

## 2023-11-29 ASSESSMENT — ENCOUNTER SYMPTOMS
DIARRHEA: 0
COUGH: 0
CHILLS: 0
NAUSEA: 0
FATIGUE: 0
FEVER: 0
CONSTIPATION: 0
DIFFICULTY URINATING: 0
SHORTNESS OF BREATH: 0
ABDOMINAL PAIN: 0
VOMITING: 0
PALPITATIONS: 0

## 2023-12-15 ENCOUNTER — NURSING HOME VISIT (OUTPATIENT)
Dept: POST ACUTE CARE | Facility: EXTERNAL LOCATION | Age: 88
End: 2023-12-15
Payer: MEDICARE

## 2023-12-15 VITALS
HEART RATE: 93 BPM | WEIGHT: 184 LBS | TEMPERATURE: 98.2 F | SYSTOLIC BLOOD PRESSURE: 110 MMHG | DIASTOLIC BLOOD PRESSURE: 68 MMHG | OXYGEN SATURATION: 95 % | BODY MASS INDEX: 24.98 KG/M2 | RESPIRATION RATE: 18 BRPM

## 2023-12-15 DIAGNOSIS — I50.32 CHRONIC HEART FAILURE WITH PRESERVED EJECTION FRACTION (MULTI): ICD-10-CM

## 2023-12-15 DIAGNOSIS — E11.69 TYPE 2 DIABETES MELLITUS WITH OTHER SPECIFIED COMPLICATION, WITHOUT LONG-TERM CURRENT USE OF INSULIN (MULTI): ICD-10-CM

## 2023-12-15 DIAGNOSIS — M17.12 PRIMARY OSTEOARTHRITIS OF LEFT KNEE: ICD-10-CM

## 2023-12-15 DIAGNOSIS — I10 BENIGN HYPERTENSION: ICD-10-CM

## 2023-12-15 DIAGNOSIS — I48.19 ATRIAL FIBRILLATION, PERSISTENT (MULTI): ICD-10-CM

## 2023-12-15 DIAGNOSIS — I63.9 CEREBROVASCULAR ACCIDENT (CVA), UNSPECIFIED MECHANISM (MULTI): Primary | ICD-10-CM

## 2023-12-15 DIAGNOSIS — F03.B0 MODERATE DEMENTIA WITHOUT BEHAVIORAL DISTURBANCE, PSYCHOTIC DISTURBANCE, MOOD DISTURBANCE, OR ANXIETY, UNSPECIFIED DEMENTIA TYPE (MULTI): ICD-10-CM

## 2023-12-15 PROCEDURE — 99308 SBSQ NF CARE LOW MDM 20: CPT | Performed by: INTERNAL MEDICINE

## 2023-12-15 NOTE — LETTER
Patient: Zaire Jara  : 1932    Encounter Date: 12/15/2023    Subjective  Patient ID: Zaire Jara is a 89 y.o. male who is long term resident being seen and evaluated for multiple medical problems.    HPI   This 89-year-old male patient in his wheelchair in his room in no distress.  He has no new complaints for me at this time.  Of course the patient's knee continues to bother him but it is bone-on-bone and there is nothing else that can be done for him.    The patient's laboratory examinations from 2023 have been reviewed in detail by me.    The patient's medication list has been reviewed in detail by me.    Review of Systems   Constitutional:  Negative for chills, fatigue and fever.   Respiratory:  Negative for cough and shortness of breath.    Cardiovascular:  Negative for chest pain and palpitations.   Gastrointestinal:  Negative for abdominal pain, constipation, diarrhea, nausea and vomiting.   Genitourinary:  Negative for difficulty urinating.       Objective  /68   Pulse 93   Temp 36.8 °C (98.2 °F)   Resp 18   Wt 83.5 kg (184 lb)   SpO2 95%   BMI 24.98 kg/m²     Physical Exam  Constitutional:       General: He is not in acute distress.  HENT:      Head: Normocephalic and atraumatic.   Eyes:      Conjunctiva/sclera: Conjunctivae normal.   Cardiovascular:      Rate and Rhythm: Normal rate and regular rhythm.   Pulmonary:      Effort: Pulmonary effort is normal. No respiratory distress.      Breath sounds: Normal breath sounds.   Abdominal:      General: Bowel sounds are normal. There is no distension.      Palpations: Abdomen is soft.      Tenderness: There is no abdominal tenderness.   Musculoskeletal:      Right lower leg: No edema.      Left lower leg: No edema.      Comments: diffusely decreased muscle mass  Mild left sided weakness.   Skin:     General: Skin is warm and dry.   Neurological:      General: No focal deficit present.      Mental Status: He is alert.    Psychiatric:         Mood and Affect: Mood normal.         Behavior: Behavior normal.         Assessment/Plan  Problem List Items Addressed This Visit             ICD-10-CM    Atrial fibrillation, persistent (CMS/[facility]) I48.19    Benign hypertension I10    CVA (cerebral vascular accident) (CMS/[facility]) - Primary I63.9    Dementia (CMS/[facility]) F03.90    Diabetes (CMS/[facility]) E11.9    Heart failure with preserved ejection fraction (CMS/[facility]) I50.30    Primary osteoarthritis of left knee M17.12       8.  We will continue with restorative and supportive care as the patient tolerates    B.  Laboratory examinations will next be due in February 2024 or as needed    C.  The patient's prognosis is guarded.      Electronically Signed By: True Garrido MD   12/27/23  5:41 PM

## 2023-12-15 NOTE — PROGRESS NOTES
Subjective   Patient ID: Zaire Jara is a 89 y.o. male who is long term resident being seen and evaluated for multiple medical problems.    HPI   This 89-year-old male patient in his wheelchair in his room in no distress.  He has no new complaints for me at this time.  Of course the patient's knee continues to bother him but it is bone-on-bone and there is nothing else that can be done for him.    The patient's laboratory examinations from August 2023 have been reviewed in detail by me.    The patient's medication list has been reviewed in detail by me.    Review of Systems   Constitutional:  Negative for chills, fatigue and fever.   Respiratory:  Negative for cough and shortness of breath.    Cardiovascular:  Negative for chest pain and palpitations.   Gastrointestinal:  Negative for abdominal pain, constipation, diarrhea, nausea and vomiting.   Genitourinary:  Negative for difficulty urinating.       Objective   /68   Pulse 93   Temp 36.8 °C (98.2 °F)   Resp 18   Wt 83.5 kg (184 lb)   SpO2 95%   BMI 24.98 kg/m²     Physical Exam  Constitutional:       General: He is not in acute distress.  HENT:      Head: Normocephalic and atraumatic.   Eyes:      Conjunctiva/sclera: Conjunctivae normal.   Cardiovascular:      Rate and Rhythm: Normal rate and regular rhythm.   Pulmonary:      Effort: Pulmonary effort is normal. No respiratory distress.      Breath sounds: Normal breath sounds.   Abdominal:      General: Bowel sounds are normal. There is no distension.      Palpations: Abdomen is soft.      Tenderness: There is no abdominal tenderness.   Musculoskeletal:      Right lower leg: No edema.      Left lower leg: No edema.      Comments: diffusely decreased muscle mass  Mild left sided weakness.   Skin:     General: Skin is warm and dry.   Neurological:      General: No focal deficit present.      Mental Status: He is alert.   Psychiatric:         Mood and Affect: Mood normal.         Behavior: Behavior  normal.         Assessment/Plan   Problem List Items Addressed This Visit             ICD-10-CM    Atrial fibrillation, persistent (CMS/[facility]) I48.19    Benign hypertension I10    CVA (cerebral vascular accident) (CMS/[facility]) - Primary I63.9    Dementia (CMS/[facility]) F03.90    Diabetes (CMS/[facility]) E11.9    Heart failure with preserved ejection fraction (CMS/[facility]) I50.30    Primary osteoarthritis of left knee M17.12       8.  We will continue with restorative and supportive care as the patient tolerates    B.  Laboratory examinations will next be due in February 2024 or as needed    C.  The patient's prognosis is guarded.

## 2023-12-19 ENCOUNTER — NURSING HOME VISIT (OUTPATIENT)
Dept: POST ACUTE CARE | Facility: EXTERNAL LOCATION | Age: 88
End: 2023-12-19
Payer: MEDICARE

## 2023-12-19 VITALS
OXYGEN SATURATION: 95 % | WEIGHT: 184 LBS | BODY MASS INDEX: 24.98 KG/M2 | HEART RATE: 80 BPM | TEMPERATURE: 98.1 F | RESPIRATION RATE: 18 BRPM | DIASTOLIC BLOOD PRESSURE: 81 MMHG | SYSTOLIC BLOOD PRESSURE: 133 MMHG

## 2023-12-19 DIAGNOSIS — I50.32 CHRONIC HEART FAILURE WITH PRESERVED EJECTION FRACTION (MULTI): ICD-10-CM

## 2023-12-19 DIAGNOSIS — E11.69 TYPE 2 DIABETES MELLITUS WITH OTHER SPECIFIED COMPLICATION, WITHOUT LONG-TERM CURRENT USE OF INSULIN (MULTI): ICD-10-CM

## 2023-12-19 DIAGNOSIS — I10 BENIGN HYPERTENSION: Primary | ICD-10-CM

## 2023-12-19 DIAGNOSIS — I63.9 CEREBROVASCULAR ACCIDENT (CVA), UNSPECIFIED MECHANISM (MULTI): ICD-10-CM

## 2023-12-19 DIAGNOSIS — F03.B0 MODERATE DEMENTIA WITHOUT BEHAVIORAL DISTURBANCE, PSYCHOTIC DISTURBANCE, MOOD DISTURBANCE, OR ANXIETY, UNSPECIFIED DEMENTIA TYPE (MULTI): ICD-10-CM

## 2023-12-19 PROCEDURE — 99309 SBSQ NF CARE MODERATE MDM 30: CPT | Performed by: NURSE PRACTITIONER

## 2023-12-19 ASSESSMENT — ENCOUNTER SYMPTOMS
SHORTNESS OF BREATH: 0
COUGH: 0
VOMITING: 0
FATIGUE: 0
DIFFICULTY URINATING: 0
NAUSEA: 0
ABDOMINAL PAIN: 0
CHILLS: 0
DIARRHEA: 0
CONSTIPATION: 0
PALPITATIONS: 0
FEVER: 0

## 2023-12-27 ASSESSMENT — ENCOUNTER SYMPTOMS
SHORTNESS OF BREATH: 0
FEVER: 0
DIARRHEA: 0
VOMITING: 0
DIFFICULTY URINATING: 0
CHILLS: 0
NAUSEA: 0
COUGH: 0
ABDOMINAL PAIN: 0
PALPITATIONS: 0
CONSTIPATION: 0
FATIGUE: 0

## 2024-01-18 ENCOUNTER — NURSING HOME VISIT (OUTPATIENT)
Dept: POST ACUTE CARE | Facility: EXTERNAL LOCATION | Age: 89
End: 2024-01-18
Payer: MEDICARE

## 2024-01-18 VITALS
BODY MASS INDEX: 25.52 KG/M2 | OXYGEN SATURATION: 95 % | TEMPERATURE: 97.3 F | SYSTOLIC BLOOD PRESSURE: 117 MMHG | HEART RATE: 79 BPM | DIASTOLIC BLOOD PRESSURE: 64 MMHG | RESPIRATION RATE: 18 BRPM | WEIGHT: 188 LBS

## 2024-01-18 DIAGNOSIS — I50.32 CHRONIC HEART FAILURE WITH PRESERVED EJECTION FRACTION (MULTI): Primary | ICD-10-CM

## 2024-01-18 DIAGNOSIS — E77.8 OTHER DISORDERS OF GLYCOPROTEIN METABOLISM (MULTI): ICD-10-CM

## 2024-01-18 DIAGNOSIS — F33.1 MAJOR DEPRESSIVE DISORDER, RECURRENT, MODERATE (MULTI): ICD-10-CM

## 2024-01-18 DIAGNOSIS — E11.69 TYPE 2 DIABETES MELLITUS WITH OTHER SPECIFIED COMPLICATION, WITHOUT LONG-TERM CURRENT USE OF INSULIN (MULTI): ICD-10-CM

## 2024-01-18 DIAGNOSIS — I10 BENIGN HYPERTENSION: ICD-10-CM

## 2024-01-18 DIAGNOSIS — I48.19 ATRIAL FIBRILLATION, PERSISTENT (MULTI): ICD-10-CM

## 2024-01-18 DIAGNOSIS — F02.B11 DEMENTIA IN OTHER DISEASES CLASSIFIED ELSEWHERE, MODERATE, WITH AGITATION (MULTI): ICD-10-CM

## 2024-01-18 DIAGNOSIS — N18.32 STAGE 3B CHRONIC KIDNEY DISEASE (MULTI): ICD-10-CM

## 2024-01-18 DIAGNOSIS — F03.B0 MODERATE DEMENTIA WITHOUT BEHAVIORAL DISTURBANCE, PSYCHOTIC DISTURBANCE, MOOD DISTURBANCE, OR ANXIETY, UNSPECIFIED DEMENTIA TYPE (MULTI): ICD-10-CM

## 2024-01-18 DIAGNOSIS — I63.9 CEREBROVASCULAR ACCIDENT (CVA), UNSPECIFIED MECHANISM (MULTI): ICD-10-CM

## 2024-01-18 PROCEDURE — 99308 SBSQ NF CARE LOW MDM 20: CPT | Performed by: INTERNAL MEDICINE

## 2024-01-18 NOTE — PROGRESS NOTES
Subjective   Patient ID: Zaire Jara is a 89 y.o. male who is long term resident being seen and evaluated for multiple medical problems.    HPI   This 89-year-old male patient is up in his wheelchair in his room in no distress.  His only complaint remains knee pain which is chronic.  He has no complaints of shortness of breath or other chest pains to me.  Nursing has no new adverse events reported to me.    Current high risk medication:  Aldactone  Allopurinol  Eliquis  Iron  Flomax  Januvia    Laboratory examination from January 2024:  Vitamin D 32  Potassium 4.6  Bicarbonate 22  Creatinine 1.8  Albumin 3.5  Liver injury test normal  A1c 6.7  Hemoglobin 11.8    Review of Systems   Constitutional:  Negative for chills, fatigue and fever.   Respiratory:  Negative for cough and shortness of breath.    Cardiovascular:  Negative for chest pain and palpitations.   Gastrointestinal:  Negative for abdominal pain, constipation, diarrhea, nausea and vomiting.   Genitourinary:  Negative for difficulty urinating.       Objective   /64   Pulse 79   Temp 36.3 °C (97.3 °F)   Resp 18   Wt 85.3 kg (188 lb)   SpO2 95%   BMI 25.52 kg/m²     Physical Exam  Constitutional:       General: He is not in acute distress.  HENT:      Head: Normocephalic and atraumatic.   Eyes:      Conjunctiva/sclera: Conjunctivae normal.   Cardiovascular:      Rate and Rhythm: Normal rate and regular rhythm.   Pulmonary:      Effort: Pulmonary effort is normal. No respiratory distress.      Breath sounds: Normal breath sounds.   Abdominal:      General: Bowel sounds are normal. There is no distension.      Palpations: Abdomen is soft.      Tenderness: There is no abdominal tenderness.   Musculoskeletal:      Right lower leg: No edema.      Left lower leg: No edema.      Comments: diffusely decreased muscle mass  Mild left sided weakness.   Skin:     General: Skin is warm and dry.   Neurological:      General: No focal deficit present.       Mental Status: He is alert.   Psychiatric:         Mood and Affect: Mood normal.         Behavior: Behavior normal.         Assessment/Plan   Problem List Items Addressed This Visit             ICD-10-CM    Atrial fibrillation, persistent (CMS/[facility]) I48.19    Benign hypertension I10    CVA (cerebral vascular accident) (CMS/[facility]) I63.9    Dementia (CMS/[facility]) F03.90    Diabetes (CMS/[facility]) E11.9    Heart failure with preserved ejection fraction (CMS/[facility]) - Primary I50.30    Stage 3 chronic kidney disease (CMS/[facility]) N18.30     8 we will continue with restorative and supportive care as the patient tolerates    B.  Laboratory examinations will next be due in July 2024 or as needed    C.  The patient's prognosis is guarded.

## 2024-01-18 NOTE — LETTER
Patient: Zaire Jara  : 1932    Encounter Date: 2024    Subjective  Patient ID: Zaire Jara is a 89 y.o. male who is long term resident being seen and evaluated for multiple medical problems.    HPI   This 89-year-old male patient is up in his wheelchair in his room in no distress.  His only complaint remains knee pain which is chronic.  He has no complaints of shortness of breath or other chest pains to me.  Nursing has no new adverse events reported to me.    Current high risk medication:  Aldactone  Allopurinol  Eliquis  Iron  Flomax  Januvia    Laboratory examination from 2024:  Vitamin D 32  Potassium 4.6  Bicarbonate 22  Creatinine 1.8  Albumin 3.5  Liver injury test normal  A1c 6.7  Hemoglobin 11.8    Review of Systems   Constitutional:  Negative for chills, fatigue and fever.   Respiratory:  Negative for cough and shortness of breath.    Cardiovascular:  Negative for chest pain and palpitations.   Gastrointestinal:  Negative for abdominal pain, constipation, diarrhea, nausea and vomiting.   Genitourinary:  Negative for difficulty urinating.       Objective  /64   Pulse 79   Temp 36.3 °C (97.3 °F)   Resp 18   Wt 85.3 kg (188 lb)   SpO2 95%   BMI 25.52 kg/m²     Physical Exam  Constitutional:       General: He is not in acute distress.  HENT:      Head: Normocephalic and atraumatic.   Eyes:      Conjunctiva/sclera: Conjunctivae normal.   Cardiovascular:      Rate and Rhythm: Normal rate and regular rhythm.   Pulmonary:      Effort: Pulmonary effort is normal. No respiratory distress.      Breath sounds: Normal breath sounds.   Abdominal:      General: Bowel sounds are normal. There is no distension.      Palpations: Abdomen is soft.      Tenderness: There is no abdominal tenderness.   Musculoskeletal:      Right lower leg: No edema.      Left lower leg: No edema.      Comments: diffusely decreased muscle mass  Mild left sided weakness.   Skin:     General: Skin is warm  and dry.   Neurological:      General: No focal deficit present.      Mental Status: He is alert.   Psychiatric:         Mood and Affect: Mood normal.         Behavior: Behavior normal.         Assessment/Plan  Problem List Items Addressed This Visit             ICD-10-CM    Atrial fibrillation, persistent (CMS/[facility]) I48.19    Benign hypertension I10    CVA (cerebral vascular accident) (CMS/[facility]) I63.9    Dementia (CMS/[facility]) F03.90    Diabetes (CMS/[facility]) E11.9    Heart failure with preserved ejection fraction (CMS/[facility]) - Primary I50.30    Stage 3 chronic kidney disease (CMS/[facility]) N18.30     8 we will continue with restorative and supportive care as the patient tolerates    B.  Laboratory examinations will next be due in July 2024 or as needed    C.  The patient's prognosis is guarded.        Electronically Signed By: True Garrido MD   1/24/24  4:50 PM

## 2024-01-24 PROBLEM — E77.8 OTHER DISORDERS OF GLYCOPROTEIN METABOLISM (MULTI): Status: ACTIVE | Noted: 2024-01-24

## 2024-01-24 PROBLEM — F33.1 MAJOR DEPRESSIVE DISORDER, RECURRENT, MODERATE (MULTI): Status: ACTIVE | Noted: 2024-01-24

## 2024-01-24 PROBLEM — F02.B11: Status: ACTIVE | Noted: 2024-01-24

## 2024-01-24 ASSESSMENT — ENCOUNTER SYMPTOMS
COUGH: 0
ABDOMINAL PAIN: 0
DIFFICULTY URINATING: 0
CHILLS: 0
NAUSEA: 0
PALPITATIONS: 0
DIARRHEA: 0
CONSTIPATION: 0
VOMITING: 0
SHORTNESS OF BREATH: 0
FEVER: 0
FATIGUE: 0

## 2024-02-06 ENCOUNTER — NURSING HOME VISIT (OUTPATIENT)
Dept: POST ACUTE CARE | Facility: EXTERNAL LOCATION | Age: 89
End: 2024-02-06
Payer: MEDICARE

## 2024-02-06 VITALS
BODY MASS INDEX: 25.38 KG/M2 | TEMPERATURE: 98.2 F | OXYGEN SATURATION: 96 % | HEART RATE: 68 BPM | WEIGHT: 187 LBS | RESPIRATION RATE: 18 BRPM | DIASTOLIC BLOOD PRESSURE: 67 MMHG | SYSTOLIC BLOOD PRESSURE: 117 MMHG

## 2024-02-06 DIAGNOSIS — I63.9 CEREBROVASCULAR ACCIDENT (CVA), UNSPECIFIED MECHANISM (MULTI): ICD-10-CM

## 2024-02-06 DIAGNOSIS — N18.32 STAGE 3B CHRONIC KIDNEY DISEASE (MULTI): ICD-10-CM

## 2024-02-06 DIAGNOSIS — I50.32 CHRONIC HEART FAILURE WITH PRESERVED EJECTION FRACTION (MULTI): ICD-10-CM

## 2024-02-06 DIAGNOSIS — E11.69 TYPE 2 DIABETES MELLITUS WITH OTHER SPECIFIED COMPLICATION, WITHOUT LONG-TERM CURRENT USE OF INSULIN (MULTI): ICD-10-CM

## 2024-02-06 DIAGNOSIS — F03.B0 MODERATE DEMENTIA WITHOUT BEHAVIORAL DISTURBANCE, PSYCHOTIC DISTURBANCE, MOOD DISTURBANCE, OR ANXIETY, UNSPECIFIED DEMENTIA TYPE (MULTI): ICD-10-CM

## 2024-02-06 DIAGNOSIS — I48.19 ATRIAL FIBRILLATION, PERSISTENT (MULTI): Primary | ICD-10-CM

## 2024-02-06 PROCEDURE — 99309 SBSQ NF CARE MODERATE MDM 30: CPT | Performed by: NURSE PRACTITIONER

## 2024-02-06 ASSESSMENT — ENCOUNTER SYMPTOMS
ABDOMINAL PAIN: 0
SHORTNESS OF BREATH: 0
FATIGUE: 0
COUGH: 0
CONSTIPATION: 0
PALPITATIONS: 0
NAUSEA: 0
DIFFICULTY URINATING: 0
DIARRHEA: 0
FEVER: 0
VOMITING: 0
CHILLS: 0

## 2024-02-06 NOTE — PROGRESS NOTES
Subjective   Zaire Jara is a 89 y.o. male Here for routine monthly visit.    HPI There are no new problems and multiple health problems have been reviewed.  The course has been unchanged.  The patient  is mildly confused.  There are no family members present during time of visit.    he  is sitting up in chair, denies any complaints when asked.  Eating and drinking well.   No concerns per staff.       Review of Systems   Constitutional:  Negative for chills, fatigue and fever.   Respiratory:  Negative for cough and shortness of breath.    Cardiovascular:  Negative for chest pain and palpitations.   Gastrointestinal:  Negative for abdominal pain, constipation, diarrhea, nausea and vomiting.   Genitourinary:  Negative for difficulty urinating.       Objective   /67   Pulse 68   Temp 36.8 °C (98.2 °F)   Resp 18   Wt 84.8 kg (187 lb)   SpO2 96%   BMI 25.38 kg/m²     Physical Exam  Constitutional:       General: He is not in acute distress.  HENT:      Head: Normocephalic and atraumatic.   Eyes:      Conjunctiva/sclera: Conjunctivae normal.   Cardiovascular:      Rate and Rhythm: Normal rate and regular rhythm.   Pulmonary:      Effort: Pulmonary effort is normal. No respiratory distress.      Breath sounds: Normal breath sounds.   Abdominal:      General: Bowel sounds are normal. There is no distension.      Palpations: Abdomen is soft.      Tenderness: There is no abdominal tenderness.   Musculoskeletal:      Right lower leg: No edema.      Left lower leg: No edema.      Comments: diffusely decreased muscle mass  Mild left sided weakness.   Skin:     General: Skin is warm and dry.   Neurological:      General: No focal deficit present.      Mental Status: He is alert.   Psychiatric:         Mood and Affect: Mood normal.         Behavior: Behavior normal.         Assessment/Plan   Problem List Items Addressed This Visit       Atrial fibrillation, persistent (CMS/[facility]) - Primary     On eliquis,  rate  controlled.  No sx of bleeding.  continue with current medical management             CVA (cerebral vascular accident) (CMS/[facility])     On statin.  continue with current medical management           Dementia (CMS/[facility])     Mild, forgetful.           Diabetes (CMS/[facility])     Blood sugars reviewed, acceptable.  Continue with current regimen and adjust meds based on clinical course.   On januvia,  A1C 6.7 12/23, at goal           Heart failure with preserved ejection fraction (CMS/[facility])     No symptoms of central fluid volume overload.           Stage 3 chronic kidney disease (CMS/[facility])     monitor with routine labs.  Stable on labs 12/23          labs/meds/orders reviewed  staff to monitor and notify for any changes.  continue with supportive and restorative care  Next labs 6/24 and prn

## 2024-02-06 NOTE — ASSESSMENT & PLAN NOTE
Blood sugars reviewed, acceptable.  Continue with current regimen and adjust meds based on clinical course.   On januvia,  A1C 6.7 12/23, at goal

## 2024-02-06 NOTE — LETTER
Patient: Zaire Jara  : 1932    Encounter Date: 2024    Subjective  Zaire Jara is a 89 y.o. male Here for routine monthly visit.    HPI There are no new problems and multiple health problems have been reviewed.  The course has been unchanged.  The patient  is mildly confused.  There are no family members present during time of visit.    he  is sitting up in chair, denies any complaints when asked.  Eating and drinking well.   No concerns per staff.       Review of Systems   Constitutional:  Negative for chills, fatigue and fever.   Respiratory:  Negative for cough and shortness of breath.    Cardiovascular:  Negative for chest pain and palpitations.   Gastrointestinal:  Negative for abdominal pain, constipation, diarrhea, nausea and vomiting.   Genitourinary:  Negative for difficulty urinating.       Objective  /67   Pulse 68   Temp 36.8 °C (98.2 °F)   Resp 18   Wt 84.8 kg (187 lb)   SpO2 96%   BMI 25.38 kg/m²     Physical Exam  Constitutional:       General: He is not in acute distress.  HENT:      Head: Normocephalic and atraumatic.   Eyes:      Conjunctiva/sclera: Conjunctivae normal.   Cardiovascular:      Rate and Rhythm: Normal rate and regular rhythm.   Pulmonary:      Effort: Pulmonary effort is normal. No respiratory distress.      Breath sounds: Normal breath sounds.   Abdominal:      General: Bowel sounds are normal. There is no distension.      Palpations: Abdomen is soft.      Tenderness: There is no abdominal tenderness.   Musculoskeletal:      Right lower leg: No edema.      Left lower leg: No edema.      Comments: diffusely decreased muscle mass  Mild left sided weakness.   Skin:     General: Skin is warm and dry.   Neurological:      General: No focal deficit present.      Mental Status: He is alert.   Psychiatric:         Mood and Affect: Mood normal.         Behavior: Behavior normal.         Assessment/Plan  Problem List Items Addressed This Visit       Atrial  fibrillation, persistent (CMS/[facility]) - Primary     On eliquis,  rate controlled.  No sx of bleeding.  continue with current medical management             CVA (cerebral vascular accident) (CMS/[facility])     On statin.  continue with current medical management           Dementia (CMS/[facility])     Mild, forgetful.           Diabetes (CMS/[facility])     Blood sugars reviewed, acceptable.  Continue with current regimen and adjust meds based on clinical course.   On januvia,  A1C 6.7 12/23, at goal           Heart failure with preserved ejection fraction (CMS/[facility])     No symptoms of central fluid volume overload.           Stage 3 chronic kidney disease (CMS/[facility])     monitor with routine labs.  Stable on labs 12/23          labs/meds/orders reviewed  staff to monitor and notify for any changes.  continue with supportive and restorative care  Next labs 6/24 and prn      Electronically Signed By: NUSRAT Keller-CNP   2/6/24  1:00 PM

## 2024-02-23 ENCOUNTER — NURSING HOME VISIT (OUTPATIENT)
Dept: POST ACUTE CARE | Facility: EXTERNAL LOCATION | Age: 89
End: 2024-02-23
Payer: MEDICARE

## 2024-02-23 VITALS
TEMPERATURE: 98.2 F | SYSTOLIC BLOOD PRESSURE: 106 MMHG | DIASTOLIC BLOOD PRESSURE: 67 MMHG | OXYGEN SATURATION: 96 % | RESPIRATION RATE: 18 BRPM | HEART RATE: 94 BPM | WEIGHT: 188 LBS | BODY MASS INDEX: 25.52 KG/M2

## 2024-02-23 DIAGNOSIS — I63.9 CEREBROVASCULAR ACCIDENT (CVA), UNSPECIFIED MECHANISM (MULTI): ICD-10-CM

## 2024-02-23 DIAGNOSIS — I82.621 ACUTE EMBOLISM AND THROMBOSIS OF DEEP VEINS OF RIGHT UPPER EXTREMITY (MULTI): Primary | ICD-10-CM

## 2024-02-23 DIAGNOSIS — I50.32 CHRONIC HEART FAILURE WITH PRESERVED EJECTION FRACTION (MULTI): ICD-10-CM

## 2024-02-23 DIAGNOSIS — I48.19 ATRIAL FIBRILLATION, PERSISTENT (MULTI): ICD-10-CM

## 2024-02-23 DIAGNOSIS — F02.B11 DEMENTIA IN OTHER DISEASES CLASSIFIED ELSEWHERE, MODERATE, WITH AGITATION (MULTI): ICD-10-CM

## 2024-02-23 DIAGNOSIS — I10 BENIGN HYPERTENSION: ICD-10-CM

## 2024-02-23 PROCEDURE — 99309 SBSQ NF CARE MODERATE MDM 30: CPT | Performed by: INTERNAL MEDICINE

## 2024-02-23 NOTE — LETTER
Patient: Zaire Jara  : 1932    Encounter Date: 2024    Subjective  Patient ID: Zaire Jara is a 89 y.o. male who is long term resident being seen and evaluated for multiple medical problems.    HPI   This 89-year-old male patient is up and about in the wheelchair in his room in no distress.  He has no new complaints for me at this time.  Nursing has no new adverse events reported to me.    Current high risk medication:  Aldactone  Eliquis  Iron  Tamsulosin  Januvia    Laboratory examination from 2024:  Vitamin D 32  A1c 6.7  Potassium 4.6  Bicarbonate 22  Creatinine 1.8  Albumin 3.5  Liver injury test normal  Hemoglobin 10.8    Review of Systems   Constitutional:  Negative for chills, fatigue and fever.   Respiratory:  Negative for cough and shortness of breath.    Cardiovascular:  Negative for chest pain and palpitations.   Gastrointestinal:  Negative for abdominal pain, constipation, diarrhea, nausea and vomiting.   Genitourinary:  Negative for difficulty urinating.       Objective  /67   Pulse 94   Temp 36.8 °C (98.2 °F)   Resp 18   Wt 85.3 kg (188 lb)   SpO2 96%   BMI 25.52 kg/m²     Physical Exam  Constitutional:       General: He is not in acute distress.  HENT:      Head: Normocephalic and atraumatic.   Eyes:      Conjunctiva/sclera: Conjunctivae normal.   Cardiovascular:      Rate and Rhythm: Normal rate and regular rhythm.   Pulmonary:      Effort: Pulmonary effort is normal. No respiratory distress.      Breath sounds: Normal breath sounds.   Abdominal:      General: Bowel sounds are normal. There is no distension.      Palpations: Abdomen is soft.      Tenderness: There is no abdominal tenderness.   Musculoskeletal:      Right lower leg: No edema.      Left lower leg: No edema.      Comments: diffusely decreased muscle mass  Mild left sided weakness.   Skin:     General: Skin is warm and dry.   Neurological:      General: No focal deficit present.      Mental  Status: He is alert.   Psychiatric:         Mood and Affect: Mood normal.         Behavior: Behavior normal.         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  We will continue with restorative and supportive care as the patient tolerates    B.  The patient is known to have heart failure with preserved ejection fraction.  He is currently not on a complete regimen for GDMT.  At this point we will discontinue Januvia and begin Jardiance for diabetes control as well as to gain a more complete GDMT for his heart failure.    C.  Laboratory examinations next be due in July 2024 and or as needed    D.  The patient's prognosis is guarded.        Electronically Signed By: True Garrido MD   3/2/24 12:23 PM

## 2024-02-23 NOTE — PROGRESS NOTES
Subjective   Patient ID: Zaire Jara is a 89 y.o. male who is long term resident being seen and evaluated for multiple medical problems.    HPI   This 89-year-old male patient is up and about in the wheelchair in his room in no distress.  He has no new complaints for me at this time.  Nursing has no new adverse events reported to me.    Current high risk medication:  Aldactone  Eliquis  Iron  Tamsulosin  Januvia    Laboratory examination from January 2024:  Vitamin D 32  A1c 6.7  Potassium 4.6  Bicarbonate 22  Creatinine 1.8  Albumin 3.5  Liver injury test normal  Hemoglobin 10.8    Review of Systems   Constitutional:  Negative for chills, fatigue and fever.   Respiratory:  Negative for cough and shortness of breath.    Cardiovascular:  Negative for chest pain and palpitations.   Gastrointestinal:  Negative for abdominal pain, constipation, diarrhea, nausea and vomiting.   Genitourinary:  Negative for difficulty urinating.       Objective   /67   Pulse 94   Temp 36.8 °C (98.2 °F)   Resp 18   Wt 85.3 kg (188 lb)   SpO2 96%   BMI 25.52 kg/m²     Physical Exam  Constitutional:       General: He is not in acute distress.  HENT:      Head: Normocephalic and atraumatic.   Eyes:      Conjunctiva/sclera: Conjunctivae normal.   Cardiovascular:      Rate and Rhythm: Normal rate and regular rhythm.   Pulmonary:      Effort: Pulmonary effort is normal. No respiratory distress.      Breath sounds: Normal breath sounds.   Abdominal:      General: Bowel sounds are normal. There is no distension.      Palpations: Abdomen is soft.      Tenderness: There is no abdominal tenderness.   Musculoskeletal:      Right lower leg: No edema.      Left lower leg: No edema.      Comments: diffusely decreased muscle mass  Mild left sided weakness.   Skin:     General: Skin is warm and dry.   Neurological:      General: No focal deficit present.      Mental Status: He is alert.   Psychiatric:         Mood and Affect: Mood normal.          Behavior: Behavior normal.         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  We will continue with restorative and supportive care as the patient tolerates    B.  The patient is known to have heart failure with preserved ejection fraction.  He is currently not on a complete regimen for GDMT.  At this point we will discontinue Januvia and begin Jardiance for diabetes control as well as to gain a more complete GDMT for his heart failure.    C.  Laboratory examinations next be due in July 2024 and or as needed    D.  The patient's prognosis is guarded.

## 2024-03-02 PROBLEM — I82.621 ACUTE EMBOLISM AND THROMBOSIS OF DEEP VEINS OF RIGHT UPPER EXTREMITY (MULTI): Status: ACTIVE | Noted: 2024-03-02

## 2024-03-02 ASSESSMENT — ENCOUNTER SYMPTOMS
FEVER: 0
CONSTIPATION: 0
DIFFICULTY URINATING: 0
COUGH: 0
NAUSEA: 0
ABDOMINAL PAIN: 0
DIARRHEA: 0
VOMITING: 0
PALPITATIONS: 0
SHORTNESS OF BREATH: 0
FATIGUE: 0
CHILLS: 0

## 2024-03-12 ENCOUNTER — NURSING HOME VISIT (OUTPATIENT)
Dept: POST ACUTE CARE | Facility: EXTERNAL LOCATION | Age: 89
End: 2024-03-12
Payer: MEDICARE

## 2024-03-12 VITALS
OXYGEN SATURATION: 98 % | RESPIRATION RATE: 16 BRPM | DIASTOLIC BLOOD PRESSURE: 64 MMHG | TEMPERATURE: 97.8 F | BODY MASS INDEX: 25.52 KG/M2 | SYSTOLIC BLOOD PRESSURE: 118 MMHG | HEART RATE: 88 BPM | WEIGHT: 188 LBS

## 2024-03-12 DIAGNOSIS — E11.69 TYPE 2 DIABETES MELLITUS WITH OTHER SPECIFIED COMPLICATION, WITHOUT LONG-TERM CURRENT USE OF INSULIN (MULTI): ICD-10-CM

## 2024-03-12 DIAGNOSIS — I50.32 CHRONIC HEART FAILURE WITH PRESERVED EJECTION FRACTION (MULTI): ICD-10-CM

## 2024-03-12 DIAGNOSIS — N18.32 STAGE 3B CHRONIC KIDNEY DISEASE (MULTI): ICD-10-CM

## 2024-03-12 DIAGNOSIS — I48.19 ATRIAL FIBRILLATION, PERSISTENT (MULTI): Primary | ICD-10-CM

## 2024-03-12 DIAGNOSIS — F33.1 MAJOR DEPRESSIVE DISORDER, RECURRENT, MODERATE (MULTI): ICD-10-CM

## 2024-03-12 PROCEDURE — 99309 SBSQ NF CARE MODERATE MDM 30: CPT | Performed by: NURSE PRACTITIONER

## 2024-03-12 ASSESSMENT — ENCOUNTER SYMPTOMS
CHILLS: 0
VOMITING: 0
PALPITATIONS: 0
SHORTNESS OF BREATH: 0
DIARRHEA: 0
FEVER: 0
DIFFICULTY URINATING: 0
CONSTIPATION: 0
COUGH: 0
NAUSEA: 0
ABDOMINAL PAIN: 0
FATIGUE: 0

## 2024-03-12 NOTE — PROGRESS NOTES
Subjective   Zaire Jara is a 89 y.o. male Here for routine monthly visit.    HPI There are no new problems and multiple health problems have been reviewed.  The course has been unchanged.  The patient  is mildly confused.  There are no family members present during time of visit.    he  is sitting up in chair, denies any complaints when asked.  Eating and drinking well.   No concerns per staff.   Januvia was discontinued and replaced with jardiance due to hf.        Review of Systems   Constitutional:  Negative for chills, fatigue and fever.   Respiratory:  Negative for cough and shortness of breath.    Cardiovascular:  Negative for chest pain and palpitations.   Gastrointestinal:  Negative for abdominal pain, constipation, diarrhea, nausea and vomiting.   Genitourinary:  Negative for difficulty urinating.       Objective   /64   Pulse 88   Temp 36.6 °C (97.8 °F)   Resp 16   Wt 85.3 kg (188 lb)   SpO2 98%   BMI 25.52 kg/m²     Physical Exam  Constitutional:       General: He is not in acute distress.  HENT:      Head: Normocephalic and atraumatic.   Eyes:      Conjunctiva/sclera: Conjunctivae normal.   Cardiovascular:      Rate and Rhythm: Normal rate and regular rhythm.   Pulmonary:      Effort: Pulmonary effort is normal. No respiratory distress.      Breath sounds: Normal breath sounds.   Abdominal:      General: Bowel sounds are normal. There is no distension.      Palpations: Abdomen is soft.      Tenderness: There is no abdominal tenderness.   Musculoskeletal:      Right lower leg: No edema.      Left lower leg: No edema.      Comments: diffusely decreased muscle mass  Mild left sided weakness.   Skin:     General: Skin is warm and dry.   Neurological:      General: No focal deficit present.      Mental Status: He is alert.   Psychiatric:         Mood and Affect: Mood normal.         Behavior: Behavior normal.         Assessment/Plan   Problem List Items Addressed This Visit       Atrial  fibrillation, persistent (CMS/HCC) - Primary     On eliquis,  rate controlled.  No sx of bleeding.  continue with current medical management             Diabetes (CMS/[facility])     Blood sugars reviewed, acceptable.  Continue with current regimen and adjust meds based on clinical course.   On jardiance for DM and also GDMT for HF.   A1C 6.7 12/23, at goal           Heart failure with preserved ejection fraction (CMS/[facility])     No symptoms of central fluid volume overload. Started on jardiance for GDMT.    continue with current medical management             Stage 3 chronic kidney disease (CMS/[facility])     monitor with routine labs.  Stable on labs 12/23           Major depressive disorder, recurrent, moderate (CMS/HCC)     On citalopram, sx appear controlled at present.         labs/meds/orders reviewed  staff to monitor and notify for any changes.  continue with supportive and restorative care  Next labs 6/24 and prn

## 2024-03-12 NOTE — ASSESSMENT & PLAN NOTE
Blood sugars reviewed, acceptable.  Continue with current regimen and adjust meds based on clinical course.   On jardiance for DM and also GDMT for HF.   A1C 6.7 12/23, at goal

## 2024-03-12 NOTE — LETTER
Patient: Zaire Jara  : 1932    Encounter Date: 2024    Subjective  Zaire Jara is a 89 y.o. male Here for routine monthly visit.    HPI There are no new problems and multiple health problems have been reviewed.  The course has been unchanged.  The patient  is mildly confused.  There are no family members present during time of visit.    he  is sitting up in chair, denies any complaints when asked.  Eating and drinking well.   No concerns per staff.   Januvia was discontinued and replaced with jardiance due to hf.        Review of Systems   Constitutional:  Negative for chills, fatigue and fever.   Respiratory:  Negative for cough and shortness of breath.    Cardiovascular:  Negative for chest pain and palpitations.   Gastrointestinal:  Negative for abdominal pain, constipation, diarrhea, nausea and vomiting.   Genitourinary:  Negative for difficulty urinating.       Objective  /64   Pulse 88   Temp 36.6 °C (97.8 °F)   Resp 16   Wt 85.3 kg (188 lb)   SpO2 98%   BMI 25.52 kg/m²     Physical Exam  Constitutional:       General: He is not in acute distress.  HENT:      Head: Normocephalic and atraumatic.   Eyes:      Conjunctiva/sclera: Conjunctivae normal.   Cardiovascular:      Rate and Rhythm: Normal rate and regular rhythm.   Pulmonary:      Effort: Pulmonary effort is normal. No respiratory distress.      Breath sounds: Normal breath sounds.   Abdominal:      General: Bowel sounds are normal. There is no distension.      Palpations: Abdomen is soft.      Tenderness: There is no abdominal tenderness.   Musculoskeletal:      Right lower leg: No edema.      Left lower leg: No edema.      Comments: diffusely decreased muscle mass  Mild left sided weakness.   Skin:     General: Skin is warm and dry.   Neurological:      General: No focal deficit present.      Mental Status: He is alert.   Psychiatric:         Mood and Affect: Mood normal.         Behavior: Behavior normal.          Assessment/Plan  Problem List Items Addressed This Visit       Atrial fibrillation, persistent (CMS/HCC) - Primary     On eliquis,  rate controlled.  No sx of bleeding.  continue with current medical management             Diabetes (CMS/[facility])     Blood sugars reviewed, acceptable.  Continue with current regimen and adjust meds based on clinical course.   On jardiance for DM and also GDMT for HF.   A1C 6.7 12/23, at goal           Heart failure with preserved ejection fraction (CMS/[facility])     No symptoms of central fluid volume overload. Started on jardiance for GDMT.    continue with current medical management             Stage 3 chronic kidney disease (CMS/HCC)     monitor with routine labs.  Stable on labs 12/23           Major depressive disorder, recurrent, moderate (CMS/HCC)     On citalopram, sx appear controlled at present.         labs/meds/orders reviewed  staff to monitor and notify for any changes.  continue with supportive and restorative care  Next labs 6/24 and prn      Electronically Signed By: JACQUIE Keller   3/12/24 11:37 AM

## 2024-03-12 NOTE — ASSESSMENT & PLAN NOTE
No symptoms of central fluid volume overload. Started on jardiance for GDMT.    continue with current medical management

## 2024-03-15 ENCOUNTER — NURSING HOME VISIT (OUTPATIENT)
Dept: POST ACUTE CARE | Facility: EXTERNAL LOCATION | Age: 89
End: 2024-03-15
Payer: MEDICARE

## 2024-03-15 VITALS
HEART RATE: 71 BPM | WEIGHT: 188 LBS | OXYGEN SATURATION: 98 % | SYSTOLIC BLOOD PRESSURE: 145 MMHG | DIASTOLIC BLOOD PRESSURE: 95 MMHG | TEMPERATURE: 97.9 F | RESPIRATION RATE: 16 BRPM | BODY MASS INDEX: 25.52 KG/M2

## 2024-03-15 DIAGNOSIS — I50.32 CHRONIC HEART FAILURE WITH PRESERVED EJECTION FRACTION (MULTI): ICD-10-CM

## 2024-03-15 DIAGNOSIS — N39.43 BENIGN PROSTATIC HYPERPLASIA (BPH) WITH POST-VOID DRIBBLING: ICD-10-CM

## 2024-03-15 DIAGNOSIS — I48.19 ATRIAL FIBRILLATION, PERSISTENT (MULTI): ICD-10-CM

## 2024-03-15 DIAGNOSIS — N18.32 STAGE 3B CHRONIC KIDNEY DISEASE (MULTI): ICD-10-CM

## 2024-03-15 DIAGNOSIS — N40.1 BENIGN PROSTATIC HYPERPLASIA (BPH) WITH POST-VOID DRIBBLING: ICD-10-CM

## 2024-03-15 DIAGNOSIS — E11.69 TYPE 2 DIABETES MELLITUS WITH OTHER SPECIFIED COMPLICATION, WITHOUT LONG-TERM CURRENT USE OF INSULIN (MULTI): ICD-10-CM

## 2024-03-15 DIAGNOSIS — I63.9 CEREBROVASCULAR ACCIDENT (CVA), UNSPECIFIED MECHANISM (MULTI): Primary | ICD-10-CM

## 2024-03-15 DIAGNOSIS — F03.B0 MODERATE DEMENTIA WITHOUT BEHAVIORAL DISTURBANCE, PSYCHOTIC DISTURBANCE, MOOD DISTURBANCE, OR ANXIETY, UNSPECIFIED DEMENTIA TYPE (MULTI): ICD-10-CM

## 2024-03-15 DIAGNOSIS — F33.1 MAJOR DEPRESSIVE DISORDER, RECURRENT, MODERATE (MULTI): ICD-10-CM

## 2024-03-15 PROCEDURE — 99308 SBSQ NF CARE LOW MDM 20: CPT | Performed by: INTERNAL MEDICINE

## 2024-03-15 NOTE — PROGRESS NOTES
Subjective   Patient ID: Zaire Jara is a 89 y.o. male who is long term resident being seen and evaluated for multiple medical problems.    HPI   This 89-year-old male patient is resting comfortably in his wheelchair in no distress.  He has no new complaints for us.  He is in good spirits.  Nursing has no new adverse events reported to me.    Current high risk medication:  Aldactone  Allopurinol  Eliquis  Iron  Tamsulosin  Jardiance    Laboratory examination from January 2024:  Vitamin D 32  Potassium 4.6  Bicarbonate 22  Creatinine 1.8  Albumin 3.5  Liver injury test normal  A1c 6.7  Hemoglobin 12  Uric acid 4.4    Review of Systems   Constitutional:  Negative for chills, fatigue and fever.   Respiratory:  Negative for cough and shortness of breath.    Cardiovascular:  Negative for chest pain and palpitations.   Gastrointestinal:  Negative for abdominal pain, constipation, diarrhea, nausea and vomiting.   Genitourinary:  Negative for difficulty urinating.       Objective   BP (!) 145/95   Pulse 71   Temp 36.6 °C (97.9 °F)   Resp 16   Wt 85.3 kg (188 lb)   SpO2 98%   BMI 25.52 kg/m²     Physical Exam  Constitutional:       General: He is not in acute distress.  HENT:      Head: Normocephalic and atraumatic.   Eyes:      Conjunctiva/sclera: Conjunctivae normal.   Cardiovascular:      Rate and Rhythm: Normal rate and regular rhythm.   Pulmonary:      Effort: Pulmonary effort is normal. No respiratory distress.      Breath sounds: Normal breath sounds.   Abdominal:      General: Bowel sounds are normal. There is no distension.      Palpations: Abdomen is soft.      Tenderness: There is no abdominal tenderness.   Musculoskeletal:      Right lower leg: No edema.      Left lower leg: No edema.      Comments: diffusely decreased muscle mass  Mild left sided weakness.   Skin:     General: Skin is warm and dry.   Neurological:      General: No focal deficit present.      Mental Status: He is alert.   Psychiatric:          Mood and Affect: Mood normal.         Behavior: Behavior normal.         Assessment/Plan   Problem List Items Addressed This Visit             ICD-10-CM    Atrial fibrillation, persistent (CMS/[facility]) I48.19    Benign prostatic hyperplasia (BPH) with post-void dribbling N40.1, N39.43    CVA (cerebral vascular accident) (CMS/[facility]) - Primary I63.9    Dementia (CMS/[facility]) F03.90    Diabetes (CMS/[facility]) E11.9    Heart failure with preserved ejection fraction (CMS/[facility]) I50.30    Stage 3 chronic kidney disease (CMS/[facility]) N18.30    Major depressive disorder, recurrent, moderate (CMS/[facility]) F33.1     A.  We will continue with restorative and supportive care as the patient tolerance    B.  Laboratory examinations will next be due in July 2024 or as needed    C.  The patient's prognosis is guarded.

## 2024-03-15 NOTE — LETTER
Patient: Zaire Jara  : 1932    Encounter Date: 03/15/2024    Subjective  Patient ID: Zaire Jara is a 89 y.o. male who is long term resident being seen and evaluated for multiple medical problems.    HPI   This 89-year-old male patient is resting comfortably in his wheelchair in no distress.  He has no new complaints for us.  He is in good spirits.  Nursing has no new adverse events reported to me.    Current high risk medication:  Aldactone  Allopurinol  Eliquis  Iron  Tamsulosin  Jardiance    Laboratory examination from 2024:  Vitamin D 32  Potassium 4.6  Bicarbonate 22  Creatinine 1.8  Albumin 3.5  Liver injury test normal  A1c 6.7  Hemoglobin 12  Uric acid 4.4    Review of Systems   Constitutional:  Negative for chills, fatigue and fever.   Respiratory:  Negative for cough and shortness of breath.    Cardiovascular:  Negative for chest pain and palpitations.   Gastrointestinal:  Negative for abdominal pain, constipation, diarrhea, nausea and vomiting.   Genitourinary:  Negative for difficulty urinating.       Objective  BP (!) 145/95   Pulse 71   Temp 36.6 °C (97.9 °F)   Resp 16   Wt 85.3 kg (188 lb)   SpO2 98%   BMI 25.52 kg/m²     Physical Exam  Constitutional:       General: He is not in acute distress.  HENT:      Head: Normocephalic and atraumatic.   Eyes:      Conjunctiva/sclera: Conjunctivae normal.   Cardiovascular:      Rate and Rhythm: Normal rate and regular rhythm.   Pulmonary:      Effort: Pulmonary effort is normal. No respiratory distress.      Breath sounds: Normal breath sounds.   Abdominal:      General: Bowel sounds are normal. There is no distension.      Palpations: Abdomen is soft.      Tenderness: There is no abdominal tenderness.   Musculoskeletal:      Right lower leg: No edema.      Left lower leg: No edema.      Comments: diffusely decreased muscle mass  Mild left sided weakness.   Skin:     General: Skin is warm and dry.   Neurological:      General: No  focal deficit present.      Mental Status: He is alert.   Psychiatric:         Mood and Affect: Mood normal.         Behavior: Behavior normal.         Assessment/Plan  Problem List Items Addressed This Visit             ICD-10-CM    Atrial fibrillation, persistent (CMS/[facility]) I48.19    Benign prostatic hyperplasia (BPH) with post-void dribbling N40.1, N39.43    CVA (cerebral vascular accident) (CMS/[facility]) - Primary I63.9    Dementia (CMS/HCC) F03.90    Diabetes (CMS/HCC) E11.9    Heart failure with preserved ejection fraction (CMS/[facility]) I50.30    Stage 3 chronic kidney disease (CMS/[facility]) N18.30    Major depressive disorder, recurrent, moderate (CMS/[facility]) F33.1     A.  We will continue with restorative and supportive care as the patient tolerance    B.  Laboratory examinations will next be due in July 2024 or as needed    C.  The patient's prognosis is guarded.        Electronically Signed By: True Garrido MD   3/20/24  5:25 PM

## 2024-03-20 ASSESSMENT — ENCOUNTER SYMPTOMS
FEVER: 0
FATIGUE: 0
NAUSEA: 0
ABDOMINAL PAIN: 0
DIFFICULTY URINATING: 0
VOMITING: 0
CONSTIPATION: 0
CHILLS: 0
DIARRHEA: 0
COUGH: 0
PALPITATIONS: 0
SHORTNESS OF BREATH: 0

## 2025-04-29 ENCOUNTER — HOSPITAL ENCOUNTER (EMERGENCY)
Facility: HOSPITAL | Age: OVER 89
Discharge: HOME | End: 2025-04-29
Payer: MEDICARE

## 2025-04-29 VITALS
TEMPERATURE: 97.1 F | SYSTOLIC BLOOD PRESSURE: 138 MMHG | RESPIRATION RATE: 18 BRPM | HEART RATE: 94 BPM | BODY MASS INDEX: 25.47 KG/M2 | DIASTOLIC BLOOD PRESSURE: 62 MMHG | WEIGHT: 188 LBS | HEIGHT: 72 IN | OXYGEN SATURATION: 98 %

## 2025-04-29 DIAGNOSIS — L03.011 PARONYCHIA OF FINGER OF RIGHT HAND: Primary | ICD-10-CM

## 2025-04-29 PROCEDURE — 2500000004 HC RX 250 GENERAL PHARMACY W/ HCPCS (ALT 636 FOR OP/ED): Performed by: PHYSICIAN ASSISTANT

## 2025-04-29 PROCEDURE — 26010 DRAINAGE OF FINGER ABSCESS: CPT | Performed by: PHYSICIAN ASSISTANT

## 2025-04-29 PROCEDURE — 99283 EMERGENCY DEPT VISIT LOW MDM: CPT | Mod: 25

## 2025-04-29 PROCEDURE — 2500000002 HC RX 250 W HCPCS SELF ADMINISTERED DRUGS (ALT 637 FOR MEDICARE OP, ALT 636 FOR OP/ED): Performed by: PHYSICIAN ASSISTANT

## 2025-04-29 PROCEDURE — 10060 I&D ABSCESS SIMPLE/SINGLE: CPT | Performed by: PHYSICIAN ASSISTANT

## 2025-04-29 PROCEDURE — 2500000005 HC RX 250 GENERAL PHARMACY W/O HCPCS: Performed by: PHYSICIAN ASSISTANT

## 2025-04-29 PROCEDURE — 99284 EMERGENCY DEPT VISIT MOD MDM: CPT | Performed by: PHYSICIAN ASSISTANT

## 2025-04-29 RX ORDER — SULFAMETHOXAZOLE AND TRIMETHOPRIM 800; 160 MG/1; MG/1
1 TABLET ORAL 2 TIMES DAILY
Qty: 10 TABLET | Refills: 0 | Status: SHIPPED | OUTPATIENT
Start: 2025-04-29 | End: 2025-05-04

## 2025-04-29 RX ORDER — LIDOCAINE HYDROCHLORIDE 10 MG/ML
10 INJECTION, SOLUTION INFILTRATION; PERINEURAL ONCE
Status: COMPLETED | OUTPATIENT
Start: 2025-04-29 | End: 2025-04-29

## 2025-04-29 RX ORDER — BACITRACIN ZINC 500 UNIT/G
OINTMENT IN PACKET (EA) TOPICAL ONCE
Status: COMPLETED | OUTPATIENT
Start: 2025-04-29 | End: 2025-04-29

## 2025-04-29 RX ORDER — CHLORHEXIDINE GLUCONATE 40 MG/ML
SOLUTION TOPICAL DAILY PRN
Qty: 473 ML | Refills: 0 | Status: SHIPPED | OUTPATIENT
Start: 2025-04-29

## 2025-04-29 RX ORDER — SULFAMETHOXAZOLE AND TRIMETHOPRIM 800; 160 MG/1; MG/1
1 TABLET ORAL ONCE
Status: COMPLETED | OUTPATIENT
Start: 2025-04-29 | End: 2025-04-29

## 2025-04-29 RX ADMIN — SULFAMETHOXAZOLE AND TRIMETHOPRIM 1 TABLET: 800; 160 TABLET ORAL at 21:14

## 2025-04-29 RX ADMIN — BACITRACIN 1 APPLICATION: 500 OINTMENT TOPICAL at 21:14

## 2025-04-29 RX ADMIN — LIDOCAINE HYDROCHLORIDE 10 ML: 10 INJECTION, SOLUTION INFILTRATION; PERINEURAL at 20:39

## 2025-04-29 ASSESSMENT — COLUMBIA-SUICIDE SEVERITY RATING SCALE - C-SSRS
6. HAVE YOU EVER DONE ANYTHING, STARTED TO DO ANYTHING, OR PREPARED TO DO ANYTHING TO END YOUR LIFE?: NO
1. IN THE PAST MONTH, HAVE YOU WISHED YOU WERE DEAD OR WISHED YOU COULD GO TO SLEEP AND NOT WAKE UP?: NO
2. HAVE YOU ACTUALLY HAD ANY THOUGHTS OF KILLING YOURSELF?: NO

## 2025-04-29 ASSESSMENT — PAIN DESCRIPTION - LOCATION: LOCATION: FINGER (COMMENT WHICH ONE)

## 2025-04-29 ASSESSMENT — PAIN DESCRIPTION - PAIN TYPE: TYPE: ACUTE PAIN

## 2025-04-29 ASSESSMENT — PAIN - FUNCTIONAL ASSESSMENT: PAIN_FUNCTIONAL_ASSESSMENT: 0-10

## 2025-04-29 ASSESSMENT — PAIN DESCRIPTION - ORIENTATION: ORIENTATION: RIGHT

## 2025-04-29 ASSESSMENT — LIFESTYLE VARIABLES
TOTAL SCORE: 0
HAVE PEOPLE ANNOYED YOU BY CRITICIZING YOUR DRINKING: NO
EVER FELT BAD OR GUILTY ABOUT YOUR DRINKING: NO
HAVE YOU EVER FELT YOU SHOULD CUT DOWN ON YOUR DRINKING: NO
EVER HAD A DRINK FIRST THING IN THE MORNING TO STEADY YOUR NERVES TO GET RID OF A HANGOVER: NO

## 2025-04-29 ASSESSMENT — PAIN SCALES - GENERAL
PAINLEVEL_OUTOF10: 0 - NO PAIN
PAINLEVEL_OUTOF10: 0 - NO PAIN

## 2025-04-30 NOTE — DISCHARGE INSTRUCTIONS
You were seen in the emergency department for evaluation of right thumb swelling.  Your exam is consistent with a paronychia.  It was drained in the ED today.  You were started on a course of Bactrim.    Complete warm soaks at your facility.  You can mix water with antiseptic solution.    You were prescribed Bactrim and chlorhexidine.     Please follow-up with your primary care provider.  If you do not have a primary care provider you can call [phone] to make an appointment.    Please do not hesitate to return to the ED if symptoms change or worsen.

## 2025-04-30 NOTE — ED PROVIDER NOTES
HPI   Chief Complaint   Patient presents with    Wound Check     X2 weeks on PO abx  referred to ER today due to increasing inflammation and drainage from right thumb nail       HPI  Patient is a 2 old male with a past medical history of MDD, gout, osteoarthritis, dementia, CHF, CKD 3, DMT2, A-fib on Eliquis, BPH, aortic stenosis that presents to the ED for evaluation of right thumb swelling.  Patient reports he was on a antibiotic course at his nursing facility of [facility].  Symptoms have been going on for about 2 weeks.  Got better after antibiotic course and then came back.  Intermittently draining purulent material.  Denies fever, chills, nausea, vomiting.  Denies paresthesias to the hand.  Denies injury.  He is unsure what antibiotic he was on.  Finger has not been drained.  No answer from facility.  Antibiotic name is not listed on facesheet.    Patient History   Medical History[1]  Surgical History[2]  Family History[3]  Social History[4]    Physical Exam   ED Triage Vitals [04/29/25 1458]   Temperature Heart Rate Respirations BP   37.6 °C (99.7 °F) 88 18 132/68      Pulse Ox Temp Source Heart Rate Source Patient Position   97 % Temporal Monitor Sitting      BP Location FiO2 (%)     Right arm --       Physical Exam  Vitals reviewed.   Constitutional:       General: He is not in acute distress.     Appearance: Normal appearance. He is not ill-appearing, toxic-appearing or diaphoretic.   HENT:      Head: Normocephalic and atraumatic.      Nose: No congestion or rhinorrhea.   Cardiovascular:      Rate and Rhythm: Normal rate and regular rhythm.      Pulses: Normal pulses.      Heart sounds: No murmur heard.  Pulmonary:      Effort: Pulmonary effort is normal. No respiratory distress.      Breath sounds: No stridor. No wheezing or rhonchi.   Musculoskeletal:      Right wrist: Normal. No swelling, deformity or snuff box tenderness. Normal pulse.      Left wrist: Normal. No swelling, deformity or snuff box  tenderness. Normal pulse.      Right hand: Swelling and tenderness present. No bony tenderness. Normal strength. Normal sensation. Normal capillary refill. Normal pulse.      Left hand: No swelling, tenderness or bony tenderness. Normal strength. Normal sensation. Normal capillary refill. Normal pulse.      Cervical back: Neck supple.      Comments: Isolated swelling x-ray and right thumb at nail base.  Erythema and purulent drainage.  Area of fluctuance.  No spreading of erythema proximally.  2+ radial pulses.  Sensation intact.   Skin:     General: Skin is warm and dry.      Capillary Refill: Capillary refill takes less than 2 seconds.      Coloration: Skin is not jaundiced.   Neurological:      General: No focal deficit present.      Mental Status: He is alert and oriented to person, place, and time.   Psychiatric:         Mood and Affect: Mood normal.         Behavior: Behavior normal.           ED Course & MDM   Diagnoses as of 04/29/25 2106   Paronychia of finger of right hand                 No data recorded     Karolina Coma Scale Score: 15 (04/29/25 1459 : Saira Rojas, EMT)                           Medical Decision Making  Patient is a 2 old male with a past medical history of MDD, gout, osteoarthritis, dementia, CHF, CKD 3, DMT2, A-fib on Eliquis, BPH, aortic stenosis that presents to the ED for evaluation of right thumb swelling.  On exam patient is well-appearing in no acute distress.  Vital signs are stable.  Cardiopulmonary exam largely unremarkable.  MSK exam significant for area of fluctuance and erythema at base of right thumbnail.  Show includes but is not limited to paronychia, cellulitis.  Low suspicion for flexor tenosynovitis, felon based on H&P.  0 out of 4 [place] signs.  Flexion at PIP and DIP intact.  Limb is neurovascularly intact.  Low suspicion for acute bony abnormality with lack of traumatic injury.    Paronychia was drained.  See procedure note.    Social determinants of health  affecting care:  None     Patient was informed of the aforementioned findings.  Symptoms are felt to be due to paronychia.  Patient will be prescribed Bactrim.  First dose given in ED.    At this time, low suspicion for an acute process that would necessitate further emergent workup, urgent specialist consultation, or hospitalization.  Based on clinical workup, the disposition of discharge is appropriate.  Advised patient to pursue close follow-up with PCP.  Patient was provided with appropriate information to assist in obtaining the proper follow-up.  Discussed strict return to ED protocols with patient, including low threshold to return for changing/worsening symptoms.  Patient demonstrated understanding and agreement with the plan of care, and all questions answered prior to discharge.  Patient stable at time of discharge.     --------------------------------------------------------------------------------------------------------------------------------------------------------------------------------------------------------------------------    This note was dictated using a speech recognition program.  While an attempt was made at proof reading to minimize errors, minor errors in transcription may be present call for questions.     Procedure  Incision and Drainage    Performed by: Jaylin Landis PA-C  Authorized by: Jaylin Landis PA-C    Consent:     Consent obtained:  Verbal    Consent given by:  Patient    Risks discussed:  Bleeding, incomplete drainage and infection    Alternatives discussed:  No treatment and delayed treatment  Universal protocol:     Patient identity confirmed:  Verbally with patient  Location:     Type:  Abscess    Location:  Upper extremity    Upper extremity location:  Finger    Finger location:  R thumb  Pre-procedure details:     Skin preparation:  Chlorhexidine  Sedation:     Sedation type:  None  Anesthesia:     Anesthesia method:  Nerve block    Block needle gauge:  27 G     Block anesthetic:  Lidocaine 1% w/o epi    Block technique:  Ring    Block injection procedure:  Introduced needle, anatomic landmarks identified and incremental injection    Block outcome:  Anesthesia achieved  Procedure type:     Complexity:  Simple  Procedure details:     Incision types:  Stab incision    Incision depth:  Dermal    Wound management:  Extensive cleaning    Drainage:  Bloody and purulent    Drainage amount:  Moderate    Wound treatment:  Wound left open    Packing materials:  None  Post-procedure details:     Procedure completion:  Tolerated well, no immediate complications         [1]   Past Medical History:  Diagnosis Date    Other persistent atrial fibrillation (Multi) 02/23/2021    Atrial fibrillation, persistent    Personal history of other diseases of the circulatory system 07/25/2017    History of hypertension    Personal history of other endocrine, nutritional and metabolic disease 07/25/2017    History of hyperlipidemia   [2]   Past Surgical History:  Procedure Laterality Date    MR HEAD ANGIO WO IV CONTRAST  6/8/2021    MR HEAD ANGIO WO IV CONTRAST 6/8/2021 [facility]ICAL [facility]GAC[facility]    MR NECK ANGIO WO IV CONTRAST  6/8/2021    MR NECK ANGIO WO IV CONTRAST 6/8/2021 [facility] CLINICAL LEGACY   [3] No family history on file.  [4]   Social History  Tobacco Use    Smoking status: Not on file    Smokeless tobacco: Not on file   Substance Use Topics    Alcohol use: Not on file    Drug use: Not on file        Jaylin Landis PA-C  04/29/25 9559